# Patient Record
Sex: MALE | Race: OTHER
[De-identification: names, ages, dates, MRNs, and addresses within clinical notes are randomized per-mention and may not be internally consistent; named-entity substitution may affect disease eponyms.]

---

## 2018-08-17 ENCOUNTER — HOSPITAL ENCOUNTER (EMERGENCY)
Dept: HOSPITAL 61 - ER | Age: 58
Discharge: HOME | End: 2018-08-17
Payer: COMMERCIAL

## 2018-08-17 VITALS — HEIGHT: 71 IN | WEIGHT: 195 LBS | BODY MASS INDEX: 27.3 KG/M2

## 2018-08-17 VITALS — DIASTOLIC BLOOD PRESSURE: 80 MMHG | SYSTOLIC BLOOD PRESSURE: 154 MMHG

## 2018-08-17 DIAGNOSIS — R20.0: ICD-10-CM

## 2018-08-17 DIAGNOSIS — I25.2: ICD-10-CM

## 2018-08-17 DIAGNOSIS — E78.00: ICD-10-CM

## 2018-08-17 DIAGNOSIS — M79.604: ICD-10-CM

## 2018-08-17 DIAGNOSIS — I51.9: ICD-10-CM

## 2018-08-17 DIAGNOSIS — E11.40: ICD-10-CM

## 2018-08-17 DIAGNOSIS — M79.605: Primary | ICD-10-CM

## 2018-08-17 DIAGNOSIS — Z95.1: ICD-10-CM

## 2018-08-17 PROCEDURE — 99281 EMR DPT VST MAYX REQ PHY/QHP: CPT

## 2018-08-17 NOTE — PHYS DOC
Past Medical History


Past Medical History:  Diabetes-Type II, High Cholesterol, Heart Disease, MI


Additional Past Medical Histor:  Neuropathy


Past Surgical History:  Coronary Bypass Surgery


Alcohol Use:  None


Drug Use:  None





Adult General


Chief Complaint


Chief Complaint:  LOWER EXT PAIN





HPI


HPI





Patient is a 57  year old M who presents with bilateral leg pain with exertion 

for months.  Patient reports his pain is worsening.  He gets pain after walking 

only about 100 ft.  He rests and the pain resolves completely.  When he walks 

again, the pain returns.  He reports he is seen at the [facility] in [place]. 

They started him on a medication called pentoxifylline several months ago.  He 

reports no improvement with medications.  He states he is having trouble 

working d/t pain.  He denies cp or sob.  Hx of DM and HTN.





Review of Systems


Review of Systems





Constitutional: Denies fever or chills []


Respiratory: Denies cough or shortness of breath []


Cardiovascular: Denies chest pain


Musculoskeletal: Denies back pain. Bilateral leg pain when walking


Integument: Denies rash or skin lesions []


Neurologic: Denies headache, focal weakness. Bilateral numbness to the great 

toe on both feet








All other systems were reviewed and found to be within normal limits, except as 

documented in this note.





Allergies


Allergies





Allergies








Coded Allergies Type Severity Reaction Last Updated Verified


 


  No Known Drug Allergies    10/27/15 No











Physical Exam


Physical Exam





Constitutional: Well developed, well nourished, no acute distress, non-toxic 

appearance. []


HENT: Normocephalic, atraumatic


Neck: Normal range of motion, no tenderness, supple, no stridor. [] 


Cardiovascular:Heart rate regular rhythm, no murmur []


Lungs & Thorax:  Bilateral breath sounds clear to auscultation []


Skin: Warm, dry, no erythema, no rash. [] 


Extremities: No tenderness, ROM intact, no edema. [] 


Neurologic: Alert and oriented X 3, normal motor function, no focal deficits 

noted. []


Psychologic: Affect normal, judgement normal, mood normal. []





Current Patient Data


Vital Signs





 Vital Signs








  Date Time  Temp Pulse Resp B/P (MAP) Pulse Ox O2 Delivery O2 Flow Rate FiO2


 


8/17/18 15:50 98.5 95 16 154/80 (104) 98 Room Air  





 98.5       











EKG


EKG


[]





Radiology/Procedures


Radiology/Procedures


[]





Course & Med Decision Making


Course & Med Decision Making


Pertinent Labs and Imaging studies reviewed. (See chart for details)


No indication for acute evaluation at this time. Patient needs referral to 

cardiology for intermittent claudication.





d/w Dr. Dempsey, happy to see patient in the clinic for evaluation.  





Plan:  appt with Dr. Dempsey on Wed 8/22/18 at 10:15am, return precautions 

reviewed





Dragon Disclaimer


Dragon Disclaimer


This electronic medical record was generated, in whole or in part, using a 

voice recognition dictation system.





Departure


Departure


Impression:  


 Primary Impression:  


 Leg pain, bilateral


Disposition:  01 HOME, SELF-CARE


Condition:  GOOD


Referrals:  


SAMUEL DEMPSEY MD


Patient Instructions:  Intermittent Claudication





Additional Instructions:  


You have an appointment scheduled with Dr. Dempsey at 10:15am on Wednesday August 22.











HORACE PIERRE Aug 17, 2018 16:12

## 2018-08-30 ENCOUNTER — HOSPITAL ENCOUNTER (OUTPATIENT)
Dept: HOSPITAL 61 - CCL | Age: 58
Discharge: HOME | End: 2018-08-30
Attending: INTERNAL MEDICINE
Payer: COMMERCIAL

## 2018-08-30 VITALS — DIASTOLIC BLOOD PRESSURE: 72 MMHG | SYSTOLIC BLOOD PRESSURE: 125 MMHG

## 2018-08-30 VITALS — DIASTOLIC BLOOD PRESSURE: 73 MMHG | SYSTOLIC BLOOD PRESSURE: 116 MMHG

## 2018-08-30 VITALS — SYSTOLIC BLOOD PRESSURE: 121 MMHG | DIASTOLIC BLOOD PRESSURE: 72 MMHG

## 2018-08-30 VITALS — DIASTOLIC BLOOD PRESSURE: 75 MMHG | SYSTOLIC BLOOD PRESSURE: 114 MMHG

## 2018-08-30 VITALS — DIASTOLIC BLOOD PRESSURE: 65 MMHG | SYSTOLIC BLOOD PRESSURE: 111 MMHG

## 2018-08-30 VITALS — SYSTOLIC BLOOD PRESSURE: 115 MMHG | DIASTOLIC BLOOD PRESSURE: 75 MMHG

## 2018-08-30 VITALS — SYSTOLIC BLOOD PRESSURE: 114 MMHG | DIASTOLIC BLOOD PRESSURE: 79 MMHG

## 2018-08-30 VITALS — HEIGHT: 72 IN | BODY MASS INDEX: 27.09 KG/M2 | WEIGHT: 200 LBS

## 2018-08-30 VITALS — DIASTOLIC BLOOD PRESSURE: 71 MMHG | SYSTOLIC BLOOD PRESSURE: 107 MMHG

## 2018-08-30 VITALS — SYSTOLIC BLOOD PRESSURE: 147 MMHG | DIASTOLIC BLOOD PRESSURE: 79 MMHG

## 2018-08-30 VITALS — DIASTOLIC BLOOD PRESSURE: 68 MMHG | SYSTOLIC BLOOD PRESSURE: 116 MMHG

## 2018-08-30 VITALS — SYSTOLIC BLOOD PRESSURE: 119 MMHG | DIASTOLIC BLOOD PRESSURE: 73 MMHG

## 2018-08-30 VITALS — SYSTOLIC BLOOD PRESSURE: 124 MMHG | DIASTOLIC BLOOD PRESSURE: 70 MMHG

## 2018-08-30 VITALS — DIASTOLIC BLOOD PRESSURE: 63 MMHG | SYSTOLIC BLOOD PRESSURE: 113 MMHG

## 2018-08-30 VITALS — DIASTOLIC BLOOD PRESSURE: 77 MMHG | SYSTOLIC BLOOD PRESSURE: 114 MMHG

## 2018-08-30 DIAGNOSIS — Z79.01: ICD-10-CM

## 2018-08-30 DIAGNOSIS — I70.213: Primary | ICD-10-CM

## 2018-08-30 LAB
ANION GAP SERPL CALC-SCNC: 5 MMOL/L (ref 6–14)
APTT BLD: 27 SEC (ref 24–38)
BUN SERPL-MCNC: 20 MG/DL (ref 8–26)
CALCIUM SERPL-MCNC: 8.6 MG/DL (ref 8.5–10.1)
CHLORIDE SERPL-SCNC: 107 MMOL/L (ref 98–107)
CO2 SERPL-SCNC: 25 MMOL/L (ref 21–32)
CREAT SERPL-MCNC: 1.1 MG/DL (ref 0.7–1.3)
ERYTHROCYTE [DISTWIDTH] IN BLOOD BY AUTOMATED COUNT: 14.1 % (ref 11.5–14.5)
GFR SERPLBLD BASED ON 1.73 SQ M-ARVRAT: 68.5 ML/MIN
GLUCOSE SERPL-MCNC: 206 MG/DL (ref 70–99)
HCT VFR BLD CALC: 42.4 % (ref 39–53)
HGB BLD-MCNC: 14.4 G/DL (ref 13–17.5)
MCH RBC QN AUTO: 30 PG (ref 25–35)
MCHC RBC AUTO-ENTMCNC: 34 G/DL (ref 31–37)
MCV RBC AUTO: 88 FL (ref 79–100)
PLATELET # BLD AUTO: 222 X10^3/UL (ref 140–400)
POTASSIUM SERPL-SCNC: 4.8 MMOL/L (ref 3.5–5.1)
PROTHROMBIN TIME: 12.8 SEC (ref 11.7–14)
RBC # BLD AUTO: 4.8 X10^6/UL (ref 4.3–5.7)
SODIUM SERPL-SCNC: 137 MMOL/L (ref 136–145)
WBC # BLD AUTO: 7.2 X10^3/UL (ref 4–11)

## 2018-08-30 PROCEDURE — 85610 PROTHROMBIN TIME: CPT

## 2018-08-30 PROCEDURE — 85730 THROMBOPLASTIN TIME PARTIAL: CPT

## 2018-08-30 PROCEDURE — C1769 GUIDE WIRE: HCPCS

## 2018-08-30 PROCEDURE — 75630 X-RAY AORTA LEG ARTERIES: CPT

## 2018-08-30 PROCEDURE — 99152 MOD SED SAME PHYS/QHP 5/>YRS: CPT

## 2018-08-30 PROCEDURE — 99153 MOD SED SAME PHYS/QHP EA: CPT

## 2018-08-30 PROCEDURE — 85027 COMPLETE CBC AUTOMATED: CPT

## 2018-08-30 PROCEDURE — 80048 BASIC METABOLIC PNL TOTAL CA: CPT

## 2018-08-30 PROCEDURE — 36415 COLL VENOUS BLD VENIPUNCTURE: CPT

## 2018-08-30 PROCEDURE — 36246 INS CATH ABD/L-EXT ART 2ND: CPT

## 2018-08-30 NOTE — CARD
MR#: [id number]

Account#: [id number]

Accession#: [id number]

Date of Study: 08/30/2018

Ordering Physician: SAMUEL DEMPSEY, 

Referring Physician: SAMUEL DEMPSEY, 

Tech: RT Devika (R)





--------------- APPROVED REPORT --------------





Technologist: RT Devika (R)

Nurse: Nancy Tsai RN



Procedure(s) performed: 60 MINS SEDATION

Aortogram with bilateral femoral run-off



HISTORY

: The patient is a 57 year-old femalemale with a history of .



INDICATION

The indication(s) include : significant bilateral claudication (left greater than right) that is life
style limting. .



PROCEDURE NARRATIVE

After appropriate informed consent, the patient was brought to the cath lab and placed in the supine 
position. Preprocedural timeout was completed and confirmed the right patient and procedure. The bila
teral groins were prepped and draped in usual sterile fashion. Moderate sedation acheived with Fentan
yl and Versed. The patient received 2000 units of Heparin for anticoagulation. 



Access: Under lidocaine local anesthesia, a 5Fr introducer sheath was placed in the RCFA via the janee
fied seldinger technique with a J-tipped guidewire and an 18g needle. Diagnostic angiography was then
 performed using a 5Fr Omniflush catheter with digital subtraction angiography. Next, the contralater
al (LCFA) was accessed with the aid of the Omniflush catheter and a J-tipped guidewire. The omniflush
 was then exchanged for a long angled glide catheter which was then placed in the RCFA. Repeat left l
ower extremity with DSA was performed. Subsequently, the glidecatheter was used to do a pullback heather
uver on the BECKY stenosis. No significant stenosis was identified at the levels of the common iliac a
rteries and the left internal iliac artery. 



FINDINGS: 

AO: 140/80



AORTA: No significant disease. 

RCIA: No significant disease. 

REIA: No significant disease. 

RIIA: has a proximal 70% stenosis. This is the dominant supply of the right lower extremity as it exi
ts the pelvis and supplies the territory of the SFA and connects to the popliteal artery. 

RCFA: No significant disease. 

RSFA: No significant disease. The vessel is hypoplastic and terminates at the level of the adductor c
anal. 

RPOP: No significant disease. 

RAT: Occluded. 

RTP trunk: Mild irregularities of up to 30%. 

RPT: No significant disease. 

RPER: No significant disease. 



LCIA: No significant disease. 

LIIA: Has a proximal 50% eccentric stenosis. This is the main arterial supply to the lower extremity 
and exits the pelvis and connects to the popliteal artery. There is a 100%  involving the vessel p
rior to the popliteal connection. 

BECKY: No significant disease. 

LCFA: No significant disease. 

LSFA: No significant disease. This vessel is hypoplastic and gives geniculate level collaterals to th
e popliteal artery. 

LPOP: No significant disease. 

LTP: Moderate 50% stenosis. 

LPER: No significant disease. 

LPT: No significant disease. 

LAT: Occluded proximally. 



At case completion, the right sided sheath was removed via manual compression. The patient tolerated 
the procedure well and there were no immediate complications. 



Conclusion

1. 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

2. Anatomical variant with the internal iliac artery providing the major arterial supply to the legs 
bilaterally. The SFA is severely hypoplastic. 

3. 100%  of the L internal iliac artery (SFA) in the mid thigh with collaterals from the hypoplast
ic LSFA with reconstitution at the level of P2. 



Recommendations

1. Vascular surgery evaluation for bypass of the left "SFA"

2. Plan for staged PVI of the right internal iliac artery via endovascular techniques. 

3. Discussed with Dr. Espino. 



Signed by : Samuel Dempsey, 

Electronically Approved : 08/30/2018 10:30:37

## 2018-10-20 ENCOUNTER — HOSPITAL ENCOUNTER (EMERGENCY)
Dept: HOSPITAL 61 - ER | Age: 58
Discharge: HOME | End: 2018-10-20
Payer: SELF-PAY

## 2018-10-20 VITALS — SYSTOLIC BLOOD PRESSURE: 158 MMHG | DIASTOLIC BLOOD PRESSURE: 80 MMHG

## 2018-10-20 VITALS — WEIGHT: 199 LBS | BODY MASS INDEX: 29.47 KG/M2 | HEIGHT: 69 IN

## 2018-10-20 DIAGNOSIS — T81.41XA: Primary | ICD-10-CM

## 2018-10-20 DIAGNOSIS — E78.00: ICD-10-CM

## 2018-10-20 DIAGNOSIS — Z95.1: ICD-10-CM

## 2018-10-20 DIAGNOSIS — E11.40: ICD-10-CM

## 2018-10-20 DIAGNOSIS — I25.2: ICD-10-CM

## 2018-10-20 DIAGNOSIS — L53.8: ICD-10-CM

## 2018-10-20 PROCEDURE — 87075 CULTR BACTERIA EXCEPT BLOOD: CPT

## 2018-10-20 PROCEDURE — 87071 CULTURE AEROBIC QUANT OTHER: CPT

## 2018-10-20 PROCEDURE — 99284 EMERGENCY DEPT VISIT MOD MDM: CPT

## 2018-10-20 NOTE — PHYS DOC
Past Medical History


Past Medical History:  Diabetes-Type II, High Cholesterol, Heart Disease, MI


Additional Past Medical Histor:  Neuropathy


Past Surgical History:  Coronary Bypass Surgery


Additional Past Surgical Histo:  FEMORAL POPITEAL BYPASS


Alcohol Use:  None


Drug Use:  None





Adult General


Chief Complaint


Chief Complaint:  POST-OP PROBLEM





HPI


HPI





Patient is a 58  year old male who presents with post-op complication.  The 

patient underwent a femoral popliteal bypass on Sept 28. He has been recovering 

well. He presents to the ER today however with some drainage and redness at the 

surgical site in the left groin. Patient denies numbness, tingling, or cold 

sensation in that extremity. He hasn't had a fever or chills. He does not 

complain of pain. The wound on the leg and the left extremity is well-healing 

according to the patient.  Surgery completed by Dr. Fagan.





Review of Systems


Review of Systems





Constitutional: Denies fever or chills 


Eyes: Denies 


Respiratory: Denies cough 


Cardiovascular: No additional information not addressed in HPI


GI: Denies abdominal pain


Musculoskeletal: Denies back pain 


Integument: Denies rash or skin lesions


Neurologic: Denies headache





All other systems were reviewed and found to be within normal limits, except as 

documented in this note.





Current Medications


Current Medications





Current Medications








 Medications


  (Trade)  Dose


 Ordered  Sig/Makenzie  Start Time


 Stop Time Status Last Admin


Dose Admin


 


 Clindamycin HCl


  (Cleocin)  600 mg  1X  ONCE  10/20/18 11:30


 10/20/18 11:31 DC 10/20/18 11:29


600 MG


 


 Neomycin/


 Polymyxin/


 Bacitracin


  (Triple


 Antibiotic


 Ointment)  2 pkt  1X  ONCE  10/20/18 11:15


 10/20/18 11:16 DC 10/20/18 11:15


2 PKT











Allergies


Allergies





Allergies








Coded Allergies Type Severity Reaction Last Updated Verified


 


  No Known Drug Allergies    9/24/18 No











Physical Exam


Physical Exam





Constitutional: Well developed, well nourished, no acute distress, non-toxic 

appearance


HENT: Normocephalic 


Neck: Normal range of motion 


Cardiovascular:Heart rate regular rhythm, no murmur 


Lungs & Thorax:  Bilateral breath sounds clear to auscultation 


Abdomen: Bowel sounds normal, soft, no tenderness 


Skin: Warm, dry, no erythema, no rash  


Extremities:  surgical incision on medial left leg is well-appearing.  wound 

edges are well-approximated.  no local swelling, erythema, drainage.  Incision 

in left groin is mildly erythematous.  there is about 1 cm area of dehiscence 

at the proximal end of the incision with some purulent drainage overlying 

granulation tissue.  the rest of the incision is erythematous and surrounding 

skin, but no palpable fluctuance.  2+ pulses in femoral, dorsalis pedis.  Skin 

with brisk capillary refill and sensation to light touch intact.  Compartments 

of the leg are soft. 


Neurologic: Alert and oriented X 3


Psychologic: Affect normal





Current Patient Data


Vital Signs





 Vital Signs








  Date Time  Temp Pulse Resp B/P (MAP) Pulse Ox O2 Delivery O2 Flow Rate FiO2


 


10/20/18 11:40  80 16 158/80 (106) 100 Room Air  


 


10/20/18 10:42 97.7       





 97.7       











EKG


EKG


[]





Radiology/Procedures


Radiology/Procedures


[]





Course & Med Decision Making


Course & Med Decision Making


Pertinent Labs and Imaging studies reviewed. (See chart for details)





Patient is evaluated for postoperative complication of an incision in the left 

groin. Examination of the incision as documented above is suspicious for local 

incisional infection. Patient does not have a fever. His vital signs are 

normal. He is nontoxic. He denies any other complaints. There is no drainable 

fluid collection on examination today. In the ER, a swab is taken for culture. 

The patient is given clindamycin. Wound is dressed with Neosporin and gauze. I 

did discuss this patient with the vascular surgeon on-call and these were the 

recommendations. The patient will contact their office on Monday morning and 

will have an expedited appointment. I advised patient to come back to the ER 

over the weekend if his symptoms severely worsen.





Dragon Disclaimer


Dragon Disclaimer


This electronic medical record was generated, in whole or in part, using a 

voice recognition dictation system.





Departure


Departure


Impression:  


 Primary Impression:  


 Superficial incisional infection of surgical site


Disposition:  01 HOME, SELF-CARE


Condition:  GOOD


Referrals:  


NO PCP (PCP)








SAMMY FAGAN DO


Patient Instructions:  Wound Infection, Easy-to-Read, Wound Dehiscence, Easy-to-

Read





Additional Instructions:  


You have a minor infection of your surgical incision.  Take the antibiotics as 

directed.  Today, we made contact with Dr. Javier's partner, Dr. Barajas, who 

recommended you call the office Monday morning and they will expedite your 

appointment.  If your wound becomes severely worse prior to Monday, return to 

the ER.  Keep the wound covered and apply antibacterial ointment twice daily.


Scripts


Clindamycin Hcl (CLINDAMYCIN HCL) 150 Mg Capsule


1 CAP PO QID, #40 CAP


   Prov: MARGOT FERRO DO         10/20/18











MARGOT FERRO DO Oct 20, 2018 11:30

## 2019-06-24 ENCOUNTER — HOSPITAL ENCOUNTER (OUTPATIENT)
Dept: HOSPITAL 61 - CCL | Age: 59
Setting detail: OBSERVATION
Discharge: HOME | End: 2019-06-24
Attending: SURGERY | Admitting: INTERNAL MEDICINE
Payer: COMMERCIAL

## 2019-06-24 VITALS — DIASTOLIC BLOOD PRESSURE: 73 MMHG | SYSTOLIC BLOOD PRESSURE: 141 MMHG

## 2019-06-24 VITALS — BODY MASS INDEX: 27.92 KG/M2 | HEIGHT: 70 IN | WEIGHT: 195 LBS

## 2019-06-24 VITALS — SYSTOLIC BLOOD PRESSURE: 142 MMHG | DIASTOLIC BLOOD PRESSURE: 71 MMHG

## 2019-06-24 VITALS — SYSTOLIC BLOOD PRESSURE: 141 MMHG | DIASTOLIC BLOOD PRESSURE: 77 MMHG

## 2019-06-24 VITALS — DIASTOLIC BLOOD PRESSURE: 69 MMHG | SYSTOLIC BLOOD PRESSURE: 132 MMHG

## 2019-06-24 VITALS — DIASTOLIC BLOOD PRESSURE: 68 MMHG | SYSTOLIC BLOOD PRESSURE: 119 MMHG

## 2019-06-24 VITALS — SYSTOLIC BLOOD PRESSURE: 125 MMHG | DIASTOLIC BLOOD PRESSURE: 74 MMHG

## 2019-06-24 VITALS — SYSTOLIC BLOOD PRESSURE: 129 MMHG | DIASTOLIC BLOOD PRESSURE: 60 MMHG

## 2019-06-24 VITALS — DIASTOLIC BLOOD PRESSURE: 72 MMHG | SYSTOLIC BLOOD PRESSURE: 151 MMHG

## 2019-06-24 VITALS — DIASTOLIC BLOOD PRESSURE: 70 MMHG | SYSTOLIC BLOOD PRESSURE: 128 MMHG

## 2019-06-24 VITALS — DIASTOLIC BLOOD PRESSURE: 73 MMHG | SYSTOLIC BLOOD PRESSURE: 145 MMHG

## 2019-06-24 VITALS — DIASTOLIC BLOOD PRESSURE: 73 MMHG | SYSTOLIC BLOOD PRESSURE: 144 MMHG

## 2019-06-24 VITALS — DIASTOLIC BLOOD PRESSURE: 76 MMHG | SYSTOLIC BLOOD PRESSURE: 139 MMHG

## 2019-06-24 DIAGNOSIS — Z79.01: ICD-10-CM

## 2019-06-24 DIAGNOSIS — Z79.899: ICD-10-CM

## 2019-06-24 DIAGNOSIS — Z95.1: ICD-10-CM

## 2019-06-24 DIAGNOSIS — Z79.82: ICD-10-CM

## 2019-06-24 DIAGNOSIS — K21.9: ICD-10-CM

## 2019-06-24 DIAGNOSIS — I10: ICD-10-CM

## 2019-06-24 DIAGNOSIS — E11.51: ICD-10-CM

## 2019-06-24 DIAGNOSIS — I70.212: Primary | ICD-10-CM

## 2019-06-24 DIAGNOSIS — I25.10: ICD-10-CM

## 2019-06-24 DIAGNOSIS — Z82.49: ICD-10-CM

## 2019-06-24 LAB
ALBUMIN SERPL-MCNC: 3.4 G/DL (ref 3.4–5)
ALBUMIN/GLOB SERPL: 0.9 {RATIO} (ref 1–1.7)
ALP SERPL-CCNC: 128 U/L (ref 46–116)
ALT SERPL-CCNC: 14 U/L (ref 16–63)
ANION GAP SERPL CALC-SCNC: 10 MMOL/L (ref 6–14)
AST SERPL-CCNC: 12 U/L (ref 15–37)
BASOPHILS # BLD AUTO: 0.1 X10^3/UL (ref 0–0.2)
BASOPHILS NFR BLD: 1 % (ref 0–3)
BILIRUB SERPL-MCNC: 0.5 MG/DL (ref 0.2–1)
BUN SERPL-MCNC: 34 MG/DL (ref 8–26)
BUN/CREAT SERPL: 19 (ref 6–20)
CALCIUM SERPL-MCNC: 8.6 MG/DL (ref 8.5–10.1)
CHLORIDE SERPL-SCNC: 101 MMOL/L (ref 98–107)
CO2 SERPL-SCNC: 25 MMOL/L (ref 21–32)
CREAT SERPL-MCNC: 1.8 MG/DL (ref 0.7–1.3)
EOSINOPHIL NFR BLD: 0.2 X10^3/UL (ref 0–0.7)
EOSINOPHIL NFR BLD: 3 % (ref 0–3)
ERYTHROCYTE [DISTWIDTH] IN BLOOD BY AUTOMATED COUNT: 15 % (ref 11.5–14.5)
GFR SERPLBLD BASED ON 1.73 SQ M-ARVRAT: 38.7 ML/MIN
GLOBULIN SER-MCNC: 3.8 G/DL (ref 2.2–3.8)
GLUCOSE SERPL-MCNC: 221 MG/DL (ref 70–99)
HCT VFR BLD CALC: 41.6 % (ref 39–53)
HGB BLD-MCNC: 13.8 G/DL (ref 13–17.5)
LYMPHOCYTES # BLD: 2.3 X10^3/UL (ref 1–4.8)
LYMPHOCYTES NFR BLD AUTO: 30 % (ref 24–48)
MCH RBC QN AUTO: 29 PG (ref 25–35)
MCHC RBC AUTO-ENTMCNC: 33 G/DL (ref 31–37)
MCV RBC AUTO: 87 FL (ref 79–100)
MONO #: 0.7 X10^3/UL (ref 0–1.1)
MONOCYTES NFR BLD: 9 % (ref 0–9)
NEUT #: 4.6 X10^3UL (ref 1.8–7.7)
NEUTROPHILS NFR BLD AUTO: 58 % (ref 31–73)
PLATELET # BLD AUTO: 255 X10^3/UL (ref 140–400)
POTASSIUM SERPL-SCNC: 4.5 MMOL/L (ref 3.5–5.1)
PROT SERPL-MCNC: 7.2 G/DL (ref 6.4–8.2)
PROTHROMBIN TIME: 12.3 SEC (ref 11.7–14)
RBC # BLD AUTO: 4.76 X10^6/UL (ref 4.3–5.7)
SODIUM SERPL-SCNC: 136 MMOL/L (ref 136–145)
WBC # BLD AUTO: 7.9 X10^3/UL (ref 4–11)

## 2019-06-24 PROCEDURE — 96375 TX/PRO/DX INJ NEW DRUG ADDON: CPT

## 2019-06-24 PROCEDURE — 37224: CPT

## 2019-06-24 PROCEDURE — 85025 COMPLETE CBC W/AUTO DIFF WBC: CPT

## 2019-06-24 PROCEDURE — 99153 MOD SED SAME PHYS/QHP EA: CPT

## 2019-06-24 PROCEDURE — 36415 COLL VENOUS BLD VENIPUNCTURE: CPT

## 2019-06-24 PROCEDURE — 99152 MOD SED SAME PHYS/QHP 5/>YRS: CPT

## 2019-06-24 PROCEDURE — G0379 DIRECT REFER HOSPITAL OBSERV: HCPCS

## 2019-06-24 PROCEDURE — C1771 REP DEV, URINARY, W/SLING: HCPCS

## 2019-06-24 PROCEDURE — G0269 OCCLUSIVE DEVICE IN VEIN ART: HCPCS

## 2019-06-24 PROCEDURE — C1892 INTRO/SHEATH,FIXED,PEEL-AWAY: HCPCS

## 2019-06-24 PROCEDURE — C1885 CATH, TRANSLUMIN ANGIO LASER: HCPCS

## 2019-06-24 PROCEDURE — 75625 CONTRAST EXAM ABDOMINL AORTA: CPT

## 2019-06-24 PROCEDURE — 96374 THER/PROPH/DIAG INJ IV PUSH: CPT

## 2019-06-24 PROCEDURE — 75710 ARTERY X-RAYS ARM/LEG: CPT

## 2019-06-24 PROCEDURE — 76937 US GUIDE VASCULAR ACCESS: CPT

## 2019-06-24 PROCEDURE — C1894 INTRO/SHEATH, NON-LASER: HCPCS

## 2019-06-24 PROCEDURE — 82962 GLUCOSE BLOOD TEST: CPT

## 2019-06-24 PROCEDURE — G0378 HOSPITAL OBSERVATION PER HR: HCPCS

## 2019-06-24 PROCEDURE — 80053 COMPREHEN METABOLIC PANEL: CPT

## 2019-06-24 PROCEDURE — C2623 CATH, TRANSLUMIN, DRUG-COAT: HCPCS

## 2019-06-24 PROCEDURE — 85610 PROTHROMBIN TIME: CPT

## 2019-06-24 PROCEDURE — C1769 GUIDE WIRE: HCPCS

## 2019-06-24 NOTE — NUR
Discharge Note:



EMILE SAMS [place]



Discharge instructions and discharge home medications reviewed with Patient and a copy 
given. All questions have been answered and understanding verbalized. 



The following instructions and handouts were given: patient visit, follow up information. 



Discontinued lines and drains: peripheral IV, tip intact.



Patient discharged to home with self care via private vehicle. 



Patient left unit in stable condition with all personal belongings.

## 2019-06-24 NOTE — PDOC1
History and Physical


Date of Admission


Date of Admission


DATE: 6/24/19 


TIME: 18:36





Identification/Chief Complaint


Chief Complaint


CTA runoff observation





Source


Source:  Chart review, Patient





History of Present Illness


History of Present Illness


58 year old with PVD admitted for observation following CTA runoff.Has hx 

Atherosclerosis with left lower extremity claudication and underwent 

Percutaneous endovascular balloon angioplasty of the left mid fem-pop bypass


vein graft. patient seen on floor with no complaints. no bleeding at catheter 

site. started on plavix and all home meds resumed.





Past Medical History


Cardiovascular:  CAD, HTN, Other


Pulmonary:  No pertinent hx


CENTRAL NERVOUS SYSTEM:  Periperal neuropathy


GI:  GERD


Heme/Onc:  No pertinent hx


Hepatobiliary:  No pertinent hx


Psych:  No pertinent hx


Musculoskeletal:  Other


Rheumatologic:  No pertinent hx


Infectious disease:  No pertinent hx


Renal/:  No pertinent hx


Endocrine:  Diabetes





Past Surgical History


Past Surgical History:  CABG





Family History


Family History:  Coronary Artery Disease





Social History


ALCOHOL:  none


Drugs:  None





Current Medications


Current Medications





Current Medications


Sodium Chloride 1,000 ml @  100 mls/hr 1X  ONCE IV  Last administered on 

6/24/19at 11:00;  Start 6/24/19 at 11:15;  Stop 6/24/19 at 21:14


Iodixanol (Visipaque 320) 100 ml STK-MED ONCE .ROUTE ;  Start 6/24/19 at 12:20; 

Stop 6/24/19 at 12:21;  Status DC


Lidocaine HCl (Lidocaine 1% 20ml Vial) 20 ml STK-MED ONCE .ROUTE ;  Start 

6/24/19 at 12:20;  Stop 6/24/19 at 12:21;  Status DC


Heparin Sodium/ Sodium Chloride 500 ml @  As Directed STK-MED ONCE .ROUTE ;  

Start 6/24/19 at 12:20;  Stop 6/24/19 at 12:21;  Status DC


Midazolam HCl (Versed) 5 mg STK-MED ONCE .ROUTE ;  Start 6/24/19 at 13:42;  Stop

6/24/19 at 13:43;  Status DC


Fentanyl Citrate (Fentanyl 2ml Vial) 100 mcg STK-MED ONCE .ROUTE ;  Start 

6/24/19 at 13:42;  Stop 6/24/19 at 13:43;  Status DC


Heparin Sodium (Porcine) (Heparin Sodium) 10,000 unit STK-MED ONCE .ROUTE ;  

Start 6/24/19 at 13:43;  Stop 6/24/19 at 13:44;  Status DC


Heparin Sodium/ Sodium Chloride (HEPARIN for ARTERIAL LINE FLUSH) 1,000 unit 1X 

ONCE IART  Last administered on 6/24/19at 15:31;  Start 6/24/19 at 14:00;  Stop 

6/24/19 at 14:01;  Status DC


Midazolam HCl (Versed) 5 mg 1X  ONCE IV  Last administered on 6/24/19at 15:32;  

Start 6/24/19 at 14:00;  Stop 6/24/19 at 14:01;  Status DC


Fentanyl Citrate (Fentanyl 2ml Vial) 100 mcg 1X  ONCE IV  Last administered on 

6/24/19at 15:32;  Start 6/24/19 at 14:00;  Stop 6/24/19 at 14:01;  Status DC


Iodixanol (Visipaque 320) 100 ml 1X  ONCE IART  Last administered on 6/24/19at 

15:31;  Start 6/24/19 at 14:00;  Stop 6/24/19 at 14:01;  Status DC


Lidocaine HCl (Lidocaine 1% 20ml Vial) 20 ml 1X  ONCE INJ  Last administered on 

6/24/19at 15:31;  Start 6/24/19 at 14:00;  Stop 6/24/19 at 14:01;  Status DC


Heparin Sodium/ Sodium Chloride 500 ml @  As Directed STK-MED ONCE .ROUTE ;  

Start 6/24/19 at 14:21;  Stop 6/24/19 at 14:22;  Status DC


Heparin Sodium (Porcine) (Heparin Sodium) 8,000 unit 1X  ONCE IV  Last 

administered on 6/24/19at 15:35;  Start 6/24/19 at 14:45;  Stop 6/24/19 at 14

:46;  Status DC


Clopidogrel Bisulfate (Plavix) 300 mg 1X  ONCE PO  Last administered on 

6/24/19at 15:32;  Start 6/24/19 at 15:30;  Stop 6/24/19 at 15:31;  Status DC


Clopidogrel Bisulfate (Plavix) 75 mg STK-MED ONCE .ROUTE ;  Start 6/24/19 at 

15:19;  Stop 6/24/19 at 15:20;  Status DC


Clopidogrel Bisulfate (Plavix) 75 mg DAILYWBKFT PO ;  Start 6/25/19 at 08:00





Active Scripts


Active


Clopidogrel (Clopidogrel Bisulfate) 75 Mg Tablet 75 Mg PO DAILYWBKFT 30 Days


Reported


Viagra (Sildenafil Citrate) 100 Mg Tablet 1 Tab PO PRN DAILY


Nystatin 15 Gm Powder 1 Qasim TP BID


Mupirocin Ointment (Mupirocin) 22 Gm Oint...g. 1 Qasim TP DAILY


Lisinopril 20 Mg Tablet 1 Tab PO DAILY


Gabapentin 800 Mg Tablet 800 Mg PO Q8HRS


Atorvastatin Calcium 80 Mg Tablet 1 Tab PO DAILY


Janumet 50-1,000 Mg Tablet (Sitagliptin Phos/Metformin Hcl) 1 Each Tablet 1 Tab 

PO BID


Pentoxifylline 400 Mg Tablet.er 400 Mg PO TID


Cymbalta (Duloxetine Hcl) 30 Mg Capsule.dr 30 Mg PO QHS


Glipizide 10 Mg Tablet 1 Tab PO BID


Aspirin 81 Mg Tab.chew 1 Tab PO DAILY





Allergies


Allergies:  


Coded Allergies:  


     No Known Drug Allergies (Unverified , 9/24/18)





ROS


Review of System


CONSTITUTIONAL:        No fever or chills


EYES:                          No recent changes


SKIN:               No rash or itching


CARDIOVASCULAR:     No chest pain, syncope, palpitations, or edema


RESPIRATORY:            No SOB or cough


GASTROINTESTINAL:    No nausea, vomiting or abdominal pain


NEUROLOGICAL:          No headaches or weakness


ENDOCRINE:               No cold or heat intolerance


GENITOURINARY:         No urgency or frequency of urination


MUSCULOSKELETAL:   No back pain or joint pain


LYMPHATICS:               No enlarged lymph nodes


PSYCHIATRIC:              No anxiety or depression





Physical Exam


Physical Exam


GENERAL:       No apparent distress. Alert and oriented.


HEENT:            Head normocephalic, atraumatic.


NECK:              Supple


LUNGS:            Clear to auscultation.


HEART:            RRR, S1, S2 present, pulses intact


ABDOMEN:       Soft, positive bowel sounds.


EXTREMITIES:  No cyanosis or edema.


NEUROLOGIC:  Normal speech, normal tone


PSYCHIATRIC:  Normal affect, normal mood.


SKIN:                No ulceration.





Vitals


Vitals





Vital Signs








  Date Time  Temp Pulse Resp B/P (MAP) Pulse Ox O2 Delivery O2 Flow Rate FiO2


 


6/24/19 18:15  77  119/68 (85) 96   


 


6/24/19 16:02      Room Air  


 


6/24/19 15:38   18     


 


6/24/19 11:01 97.8       





 97.8       











Labs


Labs





Laboratory Tests








Test


 6/24/19


10:15 6/24/19


17:31


 


White Blood Count


 7.9 x10^3/uL


(4.0-11.0) 





 


Red Blood Count


 4.76 x10^6/uL


(4.30-5.70) 





 


Hemoglobin


 13.8 g/dL


(13.0-17.5) 





 


Hematocrit


 41.6 %


(39.0-53.0) 





 


Mean Corpuscular Volume 87 fL ()  


 


Mean Corpuscular Hemoglobin 29 pg (25-35)  


 


Mean Corpuscular Hemoglobin


Concent 33 g/dL


(31-37) 





 


Red Cell Distribution Width


 15.0 %


(11.5-14.5) 





 


Platelet Count


 255 x10^3/uL


(140-400) 





 


Neutrophils (%) (Auto) 58 % (31-73)  


 


Lymphocytes (%) (Auto) 30 % (24-48)  


 


Monocytes (%) (Auto) 9 % (0-9)  


 


Eosinophils (%) (Auto) 3 % (0-3)  


 


Basophils (%) (Auto) 1 % (0-3)  


 


Neutrophils # (Auto)


 4.6 x10^3uL


(1.8-7.7) 





 


Lymphocytes # (Auto)


 2.3 x10^3/uL


(1.0-4.8) 





 


Monocytes # (Auto)


 0.7 x10^3/uL


(0.0-1.1) 





 


Eosinophils # (Auto)


 0.2 x10^3/uL


(0.0-0.7) 





 


Basophils # (Auto)


 0.1 x10^3/uL


(0.0-0.2) 





 


Prothrombin Time


 12.3 SEC


(11.7-14.0) 





 


Prothromb Time International


Ratio 0.9 (0.8-1.1) 


 





 


Sodium Level


 136 mmol/L


(136-145) 





 


Potassium Level


 4.5 mmol/L


(3.5-5.1) 





 


Chloride Level


 101 mmol/L


() 





 


Carbon Dioxide Level


 25 mmol/L


(21-32) 





 


Anion Gap 10 (6-14)  


 


Blood Urea Nitrogen


 34 mg/dL


(8-26) 





 


Creatinine


 1.8 mg/dL


(0.7-1.3) 





 


Estimated GFR


(Cockcroft-Gault) 38.7 


 





 


BUN/Creatinine Ratio 19 (6-20)  


 


Glucose Level


 221 mg/dL


(70-99) 





 


Calcium Level


 8.6 mg/dL


(8.5-10.1) 





 


Total Bilirubin


 0.5 mg/dL


(0.2-1.0) 





 


Aspartate Amino Transf


(AST/SGOT) 12 U/L (15-37) 


 





 


Alanine Aminotransferase


(ALT/SGPT) 14 U/L (16-63) 


 





 


Alkaline Phosphatase


 128 U/L


() 





 


Total Protein


 7.2 g/dL


(6.4-8.2) 





 


Albumin


 3.4 g/dL


(3.4-5.0) 





 


Albumin/Globulin Ratio 0.9 (1.0-1.7)  


 


Glucose (Fingerstick)


 


 193 mg/dL


(70-99)








Laboratory Tests








Test


 6/24/19


10:15 6/24/19


17:31


 


White Blood Count


 7.9 x10^3/uL


(4.0-11.0) 





 


Red Blood Count


 4.76 x10^6/uL


(4.30-5.70) 





 


Hemoglobin


 13.8 g/dL


(13.0-17.5) 





 


Hematocrit


 41.6 %


(39.0-53.0) 





 


Mean Corpuscular Volume 87 fL ()  


 


Mean Corpuscular Hemoglobin 29 pg (25-35)  


 


Mean Corpuscular Hemoglobin


Concent 33 g/dL


(31-37) 





 


Red Cell Distribution Width


 15.0 %


(11.5-14.5) 





 


Platelet Count


 255 x10^3/uL


(140-400) 





 


Neutrophils (%) (Auto) 58 % (31-73)  


 


Lymphocytes (%) (Auto) 30 % (24-48)  


 


Monocytes (%) (Auto) 9 % (0-9)  


 


Eosinophils (%) (Auto) 3 % (0-3)  


 


Basophils (%) (Auto) 1 % (0-3)  


 


Neutrophils # (Auto)


 4.6 x10^3uL


(1.8-7.7) 





 


Lymphocytes # (Auto)


 2.3 x10^3/uL


(1.0-4.8) 





 


Monocytes # (Auto)


 0.7 x10^3/uL


(0.0-1.1) 





 


Eosinophils # (Auto)


 0.2 x10^3/uL


(0.0-0.7) 





 


Basophils # (Auto)


 0.1 x10^3/uL


(0.0-0.2) 





 


Prothrombin Time


 12.3 SEC


(11.7-14.0) 





 


Prothromb Time International


Ratio 0.9 (0.8-1.1) 


 





 


Sodium Level


 136 mmol/L


(136-145) 





 


Potassium Level


 4.5 mmol/L


(3.5-5.1) 





 


Chloride Level


 101 mmol/L


() 





 


Carbon Dioxide Level


 25 mmol/L


(21-32) 





 


Anion Gap 10 (6-14)  


 


Blood Urea Nitrogen


 34 mg/dL


(8-26) 





 


Creatinine


 1.8 mg/dL


(0.7-1.3) 





 


Estimated GFR


(Cockcroft-Gault) 38.7 


 





 


BUN/Creatinine Ratio 19 (6-20)  


 


Glucose Level


 221 mg/dL


(70-99) 





 


Calcium Level


 8.6 mg/dL


(8.5-10.1) 





 


Total Bilirubin


 0.5 mg/dL


(0.2-1.0) 





 


Aspartate Amino Transf


(AST/SGOT) 12 U/L (15-37) 


 





 


Alanine Aminotransferase


(ALT/SGPT) 14 U/L (16-63) 


 





 


Alkaline Phosphatase


 128 U/L


() 





 


Total Protein


 7.2 g/dL


(6.4-8.2) 





 


Albumin


 3.4 g/dL


(3.4-5.0) 





 


Albumin/Globulin Ratio 0.9 (1.0-1.7)  


 


Glucose (Fingerstick)


 


 193 mg/dL


(70-99)











VTE Prophylaxis Ordered


VTE Prophylaxis Devices:  No (observation)


VTE Pharmacological Prophylaxi:  No





Assessment/Plan


Assessment/Plan


A/P:





Atherosclerosis with left lower extremity claudication s/p  Percutaneous 

endovascular balloon angioplasty of the left mid fem-pop bypass vein graft using

a 6 x 40 [employer]onic IN.PACT Admiral drug-eluting balloon, CO2 abdominal 

aortogram, Selective CO2 angiogram of the left lower extremity, Ultrasound-

guided access of the right common femoral artery. Right common femoral artery 

[employer]. tolerated procedure well with no periprocedural bleeding. will be 

discharged on plavix for 30 days with follow scheduled in vasc clinic. all home 

meds resumed.











LANDEN DAVALOS MD              Jun 24, 2019 18:41

## 2019-06-24 NOTE — PDOC
BRIEF OPERATIVE NOTE


Date:  Jun 24, 2019


Pre-Op Diagnosis


Atherosclerosis with left lower extremity claudication


Post-Op Diagnosis


Same


Procedure Performed


CO2 angiogram with selective left lower extremity CO2 angiogram next


Percutaneous balloon angioplasty of the left mid bypass graft using a drug-

eluting balloon


Ultrasound-guided access of the right common femoral artery


Right common femoral artery [place]


Surgeon


Sammy Miles DO, FACS, RPVI


Anesthesia Type:  Local, Conscious Sedation


Blood Loss


2mL


Findings


See dictation


Complications


None











SAMMY MILES DO                  Jun 24, 2019 16:59

## 2019-06-24 NOTE — OP
DATE OF SURGERY:  06/24/2019



VASCULAR SURGERY OPERATIVE REPORT



ATTENDING SURGEON:  Bib Fagan DO



PREOPERATIVE DIAGNOSIS:  Atherosclerosis with left lower extremity claudication.



POSTOPERATIVE DIAGNOSIS:  Atherosclerosis with left lower extremity

claudication.



PROCEDURES:

1.  Percutaneous endovascular balloon angioplasty of the left mid fem-pop bypass

vein graft using a 6 x 40 Medtronic IN.PACT Admiral drug-eluting balloon (CPT

code 42689).

2.  CO2 abdominal aortogram (CPT code 26268-78).

3.  Selective CO2 angiogram of the left lower extremity (CPT code 91259-15).

4.  Ultrasound-guided access of the right common femoral artery (CPT code

98589-54).

5.  Right common femoral artery StarClose.



ANESTHESIA:  Local with moderate sedation.



SPECIMENS:  None.



COMPLICATIONS:  None.



TOTAL CONTRAST ADMINISTERED:  10 mL, the remainder was CO2.



PREOPERATIVE INDICATIONS:  The patient is a very pleasant 58-year-old male, who

I had performed a left femoral to below knee popliteal artery bypass using in

situ saphenous vein graft.  The patient presented for routine followup and was

found to have a high-grade stenosis of the mid bypass graft.  The patient had

preserved TONYA despite the stenotic area, but I felt that for graft preservation,

it would be warranted to balloon this area.  The patient was consented for

angiography and he understood all risks, benefits, and alternatives prior to

proceeding.



OPERATIVE PROCEDURE:  The patient was brought to the catheterization suite and

placed in the supine position.  After establishing adequate sedation, the right

groin was prepped and draped in sterile fashion.  Next, a timeout procedure was

performed.  It was confirmed that the patient's creatinine level was elevated at

1.8 and we had planned to use carbon dioxide for the majority of the procedure

and images.



At this point in time, using a sterile ultrasound probe, the right common

femoral artery was directly visualized and then 1% lidocaine was infiltrated

underneath the skin.  Next, a micropuncture needle was used to access the right

common femoral artery under direct ultrasound guidance.  Following this, a

microwire was inserted under fluoro guidance.  Next, a small skin incision was

made, needle was removed and a MicroSheath was inserted.  Next, an 0.035 J-wire

was inserted into the abdominal aorta under fluoro guidance.  Following this,

the MicroSheath was removed and a 5-Vietnamese sheath was inserted and appropriately

flushed.  Next, the UF catheter was inserted into the abdominal aorta up to the

level of L1 and then a CO2 abdominal aortogram was performed.



The abdominal aortogram findings are as follows:



The proximal, mid and distal aorta is widely patent.  The right and left common

iliac arteries are widely patent.  There is a high takeoff of the left

hypogastric artery just distal to the aortic bifurcation, which is widely

patent.  The external iliac artery is also widely patent down to the common

femoral artery.  Common femoral artery is widely patent and I can easily see the

takeoff of the bypass graft.



The left lower extremity arterial findings are as follows:



The left common femoral artery is widely patent.  The left profunda femoral

artery is widely patent.  The origin of the left bypass graft is patent at the

common femoral artery.  The bypass graft is widely patent through its proximal

segment and there is a high-grade stenosis in its mid segment likely from a

retained valve body.  Beyond this, the vein graft is widely patent down to the

below knee anastomosis, which is also widely patent.  The patient does have

several AV fistulas which are patent off the vein graft that was utilized.  The

below knee runoff vessels are preserved and patent.



At this point in time, our catheter was used to select the common iliac artery

on the left and ultimately gain access to the external iliac artery.  Following

this, our 5-Vietnamese sheath was exchanged for a 6-Vietnamese straight destination

sheath, which was positioned up and over the bifurcation.  Next, the patient was

heparinized per weight-based protocol.  Following this, I was easily able to

gain access to the proximal portion of the bypass graft with wire access.  Next,

I carefully crossed the very stenotic area when in the mid bypass graft and

confirmed a true lumen access.  Next, the stenotic area was predilated using a 4

x 40 balloon and then using a roadmap, I treated the lesion with a 6 x 40

drug-eluting balloon.  This was inflated to nominal pressure and left in place

for 3 minutes.  Following this, completion angiogram revealed markedly improved

flow through the segment.  There was still some scar from the valve body, but

brisk flow through the segment after ballooning.  This was widely patent and

there was no further waste on our balloon.  I did decide to re-balloon this area

with a 7 x 40 balloon just to ensure that I had adequately treated the area and

indeed there was no waste on the 7 mm balloon after a nominal inflation. 

Satisfied with this result, our wires and catheters were withdrawn and the

sheath was withdrawn into the right iliac system.  The vessel was determined

safe for closure based on CO2.  We did inject a total of 10 mL of normal

contrast, but the remainder of the pictures were performed using carbon dioxide

gas.  Next, our sheath was removed and the StarClose device was inserted and

deployed successfully.  The patient had excellent hemostasis and was transferred

to the post-catheterization area in stable condition.

 



______________________________

BIB FAGAN DO



DR:  LUPILLO/andrea  JOB#:  [id number] / [id number]

DD:  06/24/2019 16:23  DT:  06/24/2019 16:50

## 2019-06-24 NOTE — NUR
Received report from [facility] cath lab.  Pt arrived to the unit at approx 1550.  Checked 
the insertion site with the hand-off nurse, dressing clean, dry, and intact.  Checked pedis 
pulses using a doppler, bilat pulses present.  Cap refill < 3 seconds. Bilat lung sounds 
clear.  Heartbeat regular, S1, S2.  Skin clean, dry, intact.  Pt reporting 0/10 pain.  Pt 
was educated to remain flat with legs extended.  Will continue to monitor pt.

## 2019-06-24 NOTE — PDOC3
Discharge Summary


Visit Information


Date of Admission:  Jun 24, 2019


Date of Discharge:  Jun 24, 2019





Brief Hospital Course


Allergies





                                    Allergies








Coded Allergies Type Severity Reaction Last Updated Verified


 


  No Known Drug Allergies    9/24/18 No








Vital Signs





Vital Signs








  Date Time  Temp Pulse Resp B/P (MAP) Pulse Ox O2 Delivery O2 Flow Rate FiO2


 


6/24/19 18:15  77  119/68 (85) 96   


 


6/24/19 16:02      Room Air  


 


6/24/19 15:38   18     


 


6/24/19 11:01 97.8       





 97.8       








Lab Results





Laboratory Tests








Test


 6/24/19


10:15 6/24/19


17:31


 


White Blood Count


 7.9 x10^3/uL


(4.0-11.0) 





 


Red Blood Count


 4.76 x10^6/uL


(4.30-5.70) 





 


Hemoglobin


 13.8 g/dL


(13.0-17.5) 





 


Hematocrit


 41.6 %


(39.0-53.0) 





 


Mean Corpuscular Volume 87 fL ()  


 


Mean Corpuscular Hemoglobin 29 pg (25-35)  


 


Mean Corpuscular Hemoglobin


Concent 33 g/dL


(31-37) 





 


Red Cell Distribution Width


 15.0 %


(11.5-14.5) 





 


Platelet Count


 255 x10^3/uL


(140-400) 





 


Neutrophils (%) (Auto) 58 % (31-73)  


 


Lymphocytes (%) (Auto) 30 % (24-48)  


 


Monocytes (%) (Auto) 9 % (0-9)  


 


Eosinophils (%) (Auto) 3 % (0-3)  


 


Basophils (%) (Auto) 1 % (0-3)  


 


Neutrophils # (Auto)


 4.6 x10^3uL


(1.8-7.7) 





 


Lymphocytes # (Auto)


 2.3 x10^3/uL


(1.0-4.8) 





 


Monocytes # (Auto)


 0.7 x10^3/uL


(0.0-1.1) 





 


Eosinophils # (Auto)


 0.2 x10^3/uL


(0.0-0.7) 





 


Basophils # (Auto)


 0.1 x10^3/uL


(0.0-0.2) 





 


Prothrombin Time


 12.3 SEC


(11.7-14.0) 





 


Prothromb Time International


Ratio 0.9 (0.8-1.1) 


 





 


Sodium Level


 136 mmol/L


(136-145) 





 


Potassium Level


 4.5 mmol/L


(3.5-5.1) 





 


Chloride Level


 101 mmol/L


() 





 


Carbon Dioxide Level


 25 mmol/L


(21-32) 





 


Anion Gap 10 (6-14)  


 


Blood Urea Nitrogen


 34 mg/dL


(8-26) 





 


Creatinine


 1.8 mg/dL


(0.7-1.3) 





 


Estimated GFR


(Cockcroft-Gault) 38.7 


 





 


BUN/Creatinine Ratio 19 (6-20)  


 


Glucose Level


 221 mg/dL


(70-99) 





 


Calcium Level


 8.6 mg/dL


(8.5-10.1) 





 


Total Bilirubin


 0.5 mg/dL


(0.2-1.0) 





 


Aspartate Amino Transf


(AST/SGOT) 12 U/L (15-37) 


 





 


Alanine Aminotransferase


(ALT/SGPT) 14 U/L (16-63) 


 





 


Alkaline Phosphatase


 128 U/L


() 





 


Total Protein


 7.2 g/dL


(6.4-8.2) 





 


Albumin


 3.4 g/dL


(3.4-5.0) 





 


Albumin/Globulin Ratio 0.9 (1.0-1.7)  


 


Glucose (Fingerstick)


 


 193 mg/dL


(70-99)








Laboratory Tests








Test


 6/24/19


10:15 6/24/19


17:31


 


White Blood Count


 7.9 x10^3/uL


(4.0-11.0) 





 


Red Blood Count


 4.76 x10^6/uL


(4.30-5.70) 





 


Hemoglobin


 13.8 g/dL


(13.0-17.5) 





 


Hematocrit


 41.6 %


(39.0-53.0) 





 


Mean Corpuscular Volume 87 fL ()  


 


Mean Corpuscular Hemoglobin 29 pg (25-35)  


 


Mean Corpuscular Hemoglobin


Concent 33 g/dL


(31-37) 





 


Red Cell Distribution Width


 15.0 %


(11.5-14.5) 





 


Platelet Count


 255 x10^3/uL


(140-400) 





 


Neutrophils (%) (Auto) 58 % (31-73)  


 


Lymphocytes (%) (Auto) 30 % (24-48)  


 


Monocytes (%) (Auto) 9 % (0-9)  


 


Eosinophils (%) (Auto) 3 % (0-3)  


 


Basophils (%) (Auto) 1 % (0-3)  


 


Neutrophils # (Auto)


 4.6 x10^3uL


(1.8-7.7) 





 


Lymphocytes # (Auto)


 2.3 x10^3/uL


(1.0-4.8) 





 


Monocytes # (Auto)


 0.7 x10^3/uL


(0.0-1.1) 





 


Eosinophils # (Auto)


 0.2 x10^3/uL


(0.0-0.7) 





 


Basophils # (Auto)


 0.1 x10^3/uL


(0.0-0.2) 





 


Prothrombin Time


 12.3 SEC


(11.7-14.0) 





 


Prothromb Time International


Ratio 0.9 (0.8-1.1) 


 





 


Sodium Level


 136 mmol/L


(136-145) 





 


Potassium Level


 4.5 mmol/L


(3.5-5.1) 





 


Chloride Level


 101 mmol/L


() 





 


Carbon Dioxide Level


 25 mmol/L


(21-32) 





 


Anion Gap 10 (6-14)  


 


Blood Urea Nitrogen


 34 mg/dL


(8-26) 





 


Creatinine


 1.8 mg/dL


(0.7-1.3) 





 


Estimated GFR


(Cockcroft-Gault) 38.7 


 





 


BUN/Creatinine Ratio 19 (6-20)  


 


Glucose Level


 221 mg/dL


(70-99) 





 


Calcium Level


 8.6 mg/dL


(8.5-10.1) 





 


Total Bilirubin


 0.5 mg/dL


(0.2-1.0) 





 


Aspartate Amino Transf


(AST/SGOT) 12 U/L (15-37) 


 





 


Alanine Aminotransferase


(ALT/SGPT) 14 U/L (16-63) 


 





 


Alkaline Phosphatase


 128 U/L


() 





 


Total Protein


 7.2 g/dL


(6.4-8.2) 





 


Albumin


 3.4 g/dL


(3.4-5.0) 





 


Albumin/Globulin Ratio 0.9 (1.0-1.7)  


 


Glucose (Fingerstick)


 


 193 mg/dL


(70-99)








Brief Hospital Course


Atherosclerosis with left lower extremity claudication s/p  Percutaneous 

endovascular balloon angioplasty of the left mid fem-pop bypass


vein graft using a 6 x 40 Medtronic IN.PACT Admiral drug-eluting balloon, CO2 

abdominal aortogram, Selective CO2 angiogram of the left lower extremity, 

Ultrasound-guided access of the right common femoral artery. Right common 

femoral artery StarClose. tolerated procedure well with no periprocedural 

bleeding. will be discharged on plavix for 30 days with follow scheduled in vasc

clinic. all home meds resumed.





Discharge Information


Condition at Discharge:  Stable


Follow Up:  Weeks (30 days with vasc sx clinic)


Disposition/Orders:  D/C to Home


Scheduled


Aspirin (Aspirin) 81 Mg Tab.chew, 1 TAB PO DAILY, #30 Ref 3 (Reported)


   Entered as Reported by: VIVIENNE CHAPA on 10/27/15 1812


   Last Action: Reviewed on 6/24/19 1015 by LETICIA HUTCHINS


Atorvastatin Calcium (Atorvastatin Calcium) 80 Mg Tablet, 1 TAB PO DAILY for 

Cholesterol, #30 Ref 5 (Reported)


   Entered as Reported by: LETICIA HUTCHINS on 6/24/19 1015


   Last Taken: Unknown Dose on 6/23/19      Last Action: New Order on 6/24/19 1015 by LETICIA HUTCHINS


Clopidogrel Bisulfate (Clopidogrel) 75 Mg Tablet, 75 MG PO DAILYWBKFT for pvd 

for 30 Days, #30


   Prescribed by: LANDEN DAVALOS MD on 6/24/19 1828


Duloxetine Hcl (Cymbalta) 30 Mg Capsule.dr, 30 MG PO QHS, (Reported)


   Entered as Reported by: AIDE ROSS on 8/30/18 0650


   Last Action: Reviewed on 6/24/19 1015 by LETICIA HUTCHINS


Gabapentin (Gabapentin) 800 Mg Tablet, 800 MG PO Q8HRS for NEUROGENIC PAIN, 

(Reported)


   Entered as Reported by: LETICIA HUTCHINS on 6/24/19 1015


   Last Action: New Order on 6/24/19 1015 by LETICIA HUTCHINS


Glipizide (Glipizide) 10 Mg Tablet, 1 TAB PO BID, #180 Ref 3 (Reported)


   Entered as Reported by: AIDE ROSS on 8/30/18 0650


   Last Action: Reviewed on 6/24/19 1015 by LETICIA HUTCHINS


Lisinopril (Lisinopril) 20 Mg Tablet, 1 TAB PO DAILY for HTN, #30 Ref 5 

(Reported)


   Entered as Reported by: LETICIA HUTCHINS on 6/24/19 1015


   Last Action: New Order on 6/24/19 1015 by LETICIA HUTCHINS


Mupirocin (Mupirocin Ointment) 22 Gm Oint...g., 1 JOSE TP DAILY for WOUND CARE, 

#1 (Reported)


   Entered as Reported by: LETICIA HUTCHINS on 6/24/19 1015


   Last Action: New Order on 6/24/19 1015 by LETICIA HUTCHINS


Nystatin (Nystatin) 15 Gm Powder, 1 JOSE TP BID for left groin, #1 (Reported)


   Entered as Reported by: LETICIA HUTCHINS on 6/24/19 1015


   Last Action: New Order on 6/24/19 1015 by LETICIA HUTCHINS


Pentoxifylline (Pentoxifylline) 400 Mg Tablet.er, 400 MG PO TID, (Reported)


   Entered as Reported by: AIDE ROSS on 8/30/18 0650


   Last Action: Reviewed on 6/24/19 1015 by LETICIA HUTCHINS


Sildenafil Citrate (Viagra) 100 Mg Tablet, 1 TAB PO PRN DAILY for RX, #6 Ref 11 

(Reported)


   Entered as Reported by: LETICIA HUTCHINS on 6/24/19 1015


   Last Action: New Order on 6/24/19 1015 by LETICIA HUTCHINS


Sitagliptin Phos/Metformin Hcl (Janumet 50-1,000 Mg Tablet) 1 Each Tablet, 1 TAB

PO BID, #180 Ref 3 (Reported)


   Entered as Reported by: AIDE ROSS on 8/30/18 0650


   Last Action: Reviewed on 6/24/19 1015 by LANDEN DOTSON MD              Jun 24, 2019 18:42

## 2019-12-04 ENCOUNTER — HOSPITAL ENCOUNTER (EMERGENCY)
Dept: HOSPITAL 61 - ER | Age: 59
Discharge: HOME | End: 2019-12-04
Payer: COMMERCIAL

## 2019-12-04 VITALS — HEIGHT: 70 IN | WEIGHT: 195 LBS | BODY MASS INDEX: 27.92 KG/M2

## 2019-12-04 VITALS — DIASTOLIC BLOOD PRESSURE: 84 MMHG | SYSTOLIC BLOOD PRESSURE: 157 MMHG

## 2019-12-04 DIAGNOSIS — K05.00: Primary | ICD-10-CM

## 2019-12-04 DIAGNOSIS — I11.9: ICD-10-CM

## 2019-12-04 DIAGNOSIS — E78.00: ICD-10-CM

## 2019-12-04 DIAGNOSIS — Z95.1: ICD-10-CM

## 2019-12-04 DIAGNOSIS — F32.9: ICD-10-CM

## 2019-12-04 DIAGNOSIS — I25.2: ICD-10-CM

## 2019-12-04 DIAGNOSIS — K08.89: ICD-10-CM

## 2019-12-04 DIAGNOSIS — E11.40: ICD-10-CM

## 2019-12-04 PROCEDURE — 99283 EMERGENCY DEPT VISIT LOW MDM: CPT

## 2019-12-04 NOTE — PHYS DOC
Past Medical History


Past Medical History:  Depression, Diabetes-Type II, High Cholesterol, Heart 

Disease, Hypertension, MI


Additional Past Medical Histor:  Neuropathy


Past Surgical History:  Coronary Bypass Surgery


Additional Past Surgical Histo:  FEMORAL POPITEAL BYPASS; 5 BYPASS


Alcohol Use:  None


Drug Use:  None





Adult General


Chief Complaint


Chief Complaint:  DENTAL PROBLEM





HPI


HPI





Patient is a 59  year old male who presents to the emergency department with 

complaints of frontal upper gingival pain for the last 5-6 weeks. PT states that

the pain increases when he is eating. He reports that he has a permanent bridge 

in the upper palate. Patient currently rates his pain 8 out of 10 on the pain 

scale, he denies any alleviating factors. Patient denies any purulent drainage, 

or bleeding from the area. He denies any recent fever, ear pain, cough, 

shortness of breath, nausea, vomiting, diarrhea, or abdominal pain. Patient st

ates the pain shoots to just below his right nare. All other ROS is neg unless 

otherwise noted in HPI.





Review of Systems


Review of Systems


See Above





Allergies


Allergies





Allergies








Coded Allergies Type Severity Reaction Last Updated Verified


 


  No Known Drug Allergies    9/24/18 No











Physical Exam


Physical Exam





Constitutional: Well developed, well nourished, no acute distress, non-toxic 

appearance. []


HENT: Normocephalic, atraumatic, bilateral external ears normal, oropharynx 

moist, no oral exudates, nose normal; permanent upper dental bridge, upper 

frontal gingival edema and erythema that is tender to palpation, no visible 

abscess, no fluctuance []


Eyes: PERRLA, EOMI, conjunctiva normal, no discharge. [] 


Neck: Normal range of motion, no stridor. [] 


Cardiovascular:Heart rate regular rhythm


Lungs & Thorax: Respirations even and unlabored, no retractions, no respiratory 

distress


Skin: Warm, dry, no erythema, no rash. [] 


Extremities: No cyanosis, ROM intact


Neurologic: Alert and oriented X 3, no focal deficits noted. []


Psychologic: Affect normal, judgement normal, mood normal. []





Current Patient Data


Vital Signs





                                   Vital Signs








  Date Time  Temp Pulse Resp B/P (MAP) Pulse Ox O2 Delivery O2 Flow Rate FiO2


 


12/4/19 11:25 98.3 92 18 157/84 (108) 99 Room Air  





 98.3       











EKG


EKG


[]





Radiology/Procedures


Radiology/Procedures


[]





Course & Med Decision Making


Course & Med Decision Making


Pertinent Labs and Imaging studies reviewed. (See chart for details)


Patient is a 59-year-old male who presented to the emergency room with 

complaints of a, pain and swelling for the last 5-6 weeks. Patient had a 

permanent upper frontal bridge. There is no visible abscess or palpable pocket 

of fluid. The pain radiated to just below the right Cristina, there is no warmth or 

erythema of the upper lip. We'll prescribe Keflex naproxen, and Peridex. Advised

 the patient that he needs to follow up with a dentist for further evaluation 

and treatment, return to the emergency room if symptoms are not any better after

 1-2 days.


Pt verbalized an understanding of home care, medications, follow-up, and return 

to ED instructions and was in agreement with the plan of care.


[]





Dragon Disclaimer


Dragon Disclaimer


This electronic medical record was generated, in whole or in part, using a voice

 recognition dictation system.





Departure


Departure


Impression:  


   Primary Impression:  


   Gingivitis, acute


   Additional Impression:  


   Dentalgia


Disposition:  01 HOME, SELF-CARE


Condition:  STABLE


Referrals:  


NO PCP (PCP)


Patient Instructions:  Dental Pain, Easy-to-Read, Gingivitis, Easy-to-Read





Additional Instructions:  


Fill prescription(s) and use as directed. Follow up with dentist using the 

referral list provided. Return to the ER if symptoms worsen.


Scripts


Cephalexin (CEPHALEXIN) 500 Mg Capsule


1 CAP PO TID for 7 Days, #21 CAP 0 Refills


   Prov: ARACELI KHAN         12/4/19 


Chlorhexidine Gluconate (PERIDEX) 15 Ml Mouthwash


15 ML SWSP BID for 7 Days, #473 ML 0 Refills


   Brush teeth before using to prevent staining. Swish for


   approximately 30 seconds before spitting.


   Prov: ARACEIL KHAN         12/4/19 


Naproxen (NAPROXEN) 500 Mg Tablet


1 TAB PO BID PRN for PAIN for 10 Days, #20 TAB 0 Refills


   Prov: ARACELI KHAN         12/4/19





Problem Qualifiers











ARACELI KHAN        Dec 4, 2019 12:10

## 2020-04-06 ENCOUNTER — HOSPITAL ENCOUNTER (EMERGENCY)
Dept: HOSPITAL 61 - ER | Age: 60
Discharge: HOME | End: 2020-04-06
Payer: COMMERCIAL

## 2020-04-06 VITALS — DIASTOLIC BLOOD PRESSURE: 70 MMHG | SYSTOLIC BLOOD PRESSURE: 154 MMHG

## 2020-04-06 VITALS — WEIGHT: 200.4 LBS | HEIGHT: 70 IN | BODY MASS INDEX: 28.69 KG/M2

## 2020-04-06 DIAGNOSIS — Z98.890: ICD-10-CM

## 2020-04-06 DIAGNOSIS — M71.21: Primary | ICD-10-CM

## 2020-04-06 DIAGNOSIS — F32.9: ICD-10-CM

## 2020-04-06 DIAGNOSIS — E78.00: ICD-10-CM

## 2020-04-06 DIAGNOSIS — I25.2: ICD-10-CM

## 2020-04-06 DIAGNOSIS — I11.9: ICD-10-CM

## 2020-04-06 DIAGNOSIS — E11.40: ICD-10-CM

## 2020-04-06 DIAGNOSIS — Z87.891: ICD-10-CM

## 2020-04-06 DIAGNOSIS — M79.661: ICD-10-CM

## 2020-04-06 LAB
ALBUMIN SERPL-MCNC: 3.5 G/DL (ref 3.4–5)
ALBUMIN/GLOB SERPL: 1 {RATIO} (ref 1–1.7)
ALP SERPL-CCNC: 98 U/L (ref 46–116)
ALT SERPL-CCNC: 15 U/L (ref 16–63)
ANION GAP SERPL CALC-SCNC: 10 MMOL/L (ref 6–14)
AST SERPL-CCNC: 21 U/L (ref 15–37)
BASOPHILS # BLD AUTO: 0 X10^3/UL (ref 0–0.2)
BASOPHILS NFR BLD: 0 % (ref 0–3)
BILIRUB SERPL-MCNC: 0.3 MG/DL (ref 0.2–1)
BUN SERPL-MCNC: 21 MG/DL (ref 8–26)
BUN/CREAT SERPL: 15 (ref 6–20)
CALCIUM SERPL-MCNC: 8.4 MG/DL (ref 8.5–10.1)
CHLORIDE SERPL-SCNC: 104 MMOL/L (ref 98–107)
CO2 SERPL-SCNC: 24 MMOL/L (ref 21–32)
CREAT SERPL-MCNC: 1.4 MG/DL (ref 0.7–1.3)
EOSINOPHIL NFR BLD: 0.1 X10^3/UL (ref 0–0.7)
EOSINOPHIL NFR BLD: 1 % (ref 0–3)
ERYTHROCYTE [DISTWIDTH] IN BLOOD BY AUTOMATED COUNT: 16.1 % (ref 11.5–14.5)
GFR SERPLBLD BASED ON 1.73 SQ M-ARVRAT: 51.5 ML/MIN
GLOBULIN SER-MCNC: 3.5 G/DL (ref 2.2–3.8)
GLUCOSE SERPL-MCNC: 263 MG/DL (ref 70–99)
HCT VFR BLD CALC: 43.5 % (ref 39–53)
HGB BLD-MCNC: 13.6 G/DL (ref 13–17.5)
LYMPHOCYTES # BLD: 2.3 X10^3/UL (ref 1–4.8)
LYMPHOCYTES NFR BLD AUTO: 31 % (ref 24–48)
MCH RBC QN AUTO: 24 PG (ref 25–35)
MCHC RBC AUTO-ENTMCNC: 31 G/DL (ref 31–37)
MCV RBC AUTO: 78 FL (ref 79–100)
MONO #: 0.7 X10^3/UL (ref 0–1.1)
MONOCYTES NFR BLD: 9 % (ref 0–9)
NEUT #: 4.5 X10^3/UL (ref 1.8–7.7)
NEUTROPHILS NFR BLD AUTO: 59 % (ref 31–73)
PLATELET # BLD AUTO: 253 X10^3/UL (ref 140–400)
POTASSIUM SERPL-SCNC: 5.1 MMOL/L (ref 3.5–5.1)
PROT SERPL-MCNC: 7 G/DL (ref 6.4–8.2)
PROTHROMBIN TIME: 12.3 SEC (ref 11.7–14)
RBC # BLD AUTO: 5.56 X10^6/UL (ref 4.3–5.7)
SODIUM SERPL-SCNC: 138 MMOL/L (ref 136–145)
WBC # BLD AUTO: 7.6 X10^3/UL (ref 4–11)

## 2020-04-06 PROCEDURE — 73564 X-RAY EXAM KNEE 4 OR MORE: CPT

## 2020-04-06 PROCEDURE — 85025 COMPLETE CBC W/AUTO DIFF WBC: CPT

## 2020-04-06 PROCEDURE — 85610 PROTHROMBIN TIME: CPT

## 2020-04-06 PROCEDURE — 93971 EXTREMITY STUDY: CPT

## 2020-04-06 PROCEDURE — 80053 COMPREHEN METABOLIC PANEL: CPT

## 2020-04-06 PROCEDURE — 29505 APPLICATION LONG LEG SPLINT: CPT

## 2020-04-06 PROCEDURE — 36415 COLL VENOUS BLD VENIPUNCTURE: CPT

## 2020-04-06 PROCEDURE — 99285 EMERGENCY DEPT VISIT HI MDM: CPT

## 2020-04-06 PROCEDURE — 93005 ELECTROCARDIOGRAM TRACING: CPT

## 2020-04-06 NOTE — RAD
KNEE RIGHT 4V 

 

DATE:  4/6/2020 3:19 PM

 

INDICATION:  Knee pain  

 

COMPARISON:  None.

 

FINDINGS:

 

Bones: There is no evidence of acute fracture or dislocation.

 

Joints:  Mild medial and patellofemoral compartment degenerative changes. 

There is no joint effusion. 

 

Miscellaneous: None.

 

IMPRESSION:  

 

No acute osseous abnormality. Mild degenerative changes.

 

Electronically signed by: Vincenzo Welch MD (4/6/2020 4:34 PM) [id number]

## 2020-04-06 NOTE — RAD
Right lower extremity venous duplex study 4/6/2020 3:48 PM

 

Clinical History:  Right lower extremity pain

 

Technique: Using a combination of real time ultrasound imaging and 

color-flow and pulse Doppler imaging techniques, including spectral 

analysis, graded compression and augmentation, duplex evaluation of the 

deep venous system of the right lower extremity was performed. Multiple 

images were obtained.

 

Findings: There is no sonographic evidence of deep venous thrombosis 

involving the visualized deep venous structures of the  right  lower 

extremity. There is a nonspecific fluid collection abutting the right 

popliteal fossa measuring 4.7 x 0.9 x 4.4 cm. 

 

 

IMPRESSION: 

1.No evidence of right lower extremity deep venous thrombosis

 

2. Fluid collection abutting the right popliteal fossa which could 

represent a Baker's cyst.  If there is history of surgery. A seroma is 

possible. There is evidence of acute infection, abscess could be 

considered.

 

Electronically signed by: Elie Singh MD (4/6/2020 3:53 PM) [id number]

## 2020-04-06 NOTE — PHYS DOC
Past Medical History


Past Medical History:  Depression, Diabetes-Type II, High Cholesterol, Heart 

Disease, Hypertension, MI


Additional Past Medical Histor:  Neuropathy


Past Surgical History:  Coronary Bypass Surgery


Additional Past Surgical Histo:  FEMORAL POPITEAL BYPASS; 5 BYPASS


Smoking Status:  Former Smoker


Alcohol Use:  None


Drug Use:  None





Adult General


Chief Complaint


Chief Complaint:  LOWER EXT PAIN





HPI


HPI





Patient is a 59  year old male who presents with right anterior knee pain and 

right calf pain that is worsened over the last 3 months.  He states is hard for 

him to walk on it.  When I asked him to explain the pain he states he cannot 

explain the way that it feels.  He denies coolness of the extremity or skin 

color changes.  Rates his pain a 7 out of 10 especially when walking.





Review of Systems


Review of Systems








Musculoskeletal: Denies back pain.  Right knee and calf joint pain []





All other systems were reviewed and found to be within normal limits, except as 

documented in this note.





Allergies


Allergies





Allergies








Coded Allergies Type Severity Reaction Last Updated Verified


 


  No Known Drug Allergies    18 No











Physical Exam


Physical Exam





Constitutional: Well developed, well nourished, no acute distress, non-toxic 

appearance. []


HENT: Normocephalic, atraumatic, bilateral external ears normal, oropharynx 

moist, no oral exudates, nose normal. []


Eyes: PERRLA, EOMI, conjunctiva normal, no discharge. [] 


Neck: Normal range of motion, no tenderness, supple, no stridor. [] 


Cardiovascular:Heart rate regular rhythm, no murmur []


Lungs & Thorax:  Bilateral breath sounds clear to auscultation []


Abdomen: Bowel sounds normal, soft, no tenderness, no masses, no pulsatile 

masses. [] 


Skin: Warm, dry, no erythema, no rash. [] 


Back: No tenderness, no CVA tenderness. [] 


Extremities: Right calf tenderness, no cyanosis, no clubbing, ROM intact, no 

edema. [] 


Neurologic: Alert and oriented X 3, normal motor function, normal sensory f

unction, no focal deficits noted. []


Psychologic: Affect normal, judgement normal, mood normal. []





Current Patient Data


Vital Signs





                                   Vital Signs








  Date Time  Temp Pulse Resp B/P (MAP) Pulse Ox O2 Delivery O2 Flow Rate FiO2


 


20 15:00 97.7 97 17 157/93 (114) 99 Room Air  





 97.7       








Lab Values





                                Laboratory Tests








Test


 4/6/20


15:15


 


White Blood Count


 7.6 x10^3/uL


(4.0-11.0)


 


Red Blood Count


 5.56 x10^6/uL


(4.30-5.70)


 


Hemoglobin


 13.6 g/dL


(13.0-17.5)


 


Hematocrit


 43.5 %


(39.0-53.0)


 


Mean Corpuscular Volume


 78 fL ()


L


 


Mean Corpuscular Hemoglobin


 24 pg (25-35)


L


 


Mean Corpuscular Hemoglobin


Concent 31 g/dL


(31-37)


 


Red Cell Distribution Width


 16.1 %


(11.5-14.5)  H


 


Platelet Count


 253 x10^3/uL


(140-400)


 


Neutrophils (%) (Auto) 59 % (31-73)  


 


Lymphocytes (%) (Auto) 31 % (24-48)  


 


Monocytes (%) (Auto) 9 % (0-9)  


 


Eosinophils (%) (Auto) 1 % (0-3)  


 


Basophils (%) (Auto) 0 % (0-3)  


 


Neutrophils # (Auto)


 4.5 x10^3/uL


(1.8-7.7)


 


Lymphocytes # (Auto)


 2.3 x10^3/uL


(1.0-4.8)


 


Monocytes # (Auto)


 0.7 x10^3/uL


(0.0-1.1)


 


Eosinophils # (Auto)


 0.1 x10^3/uL


(0.0-0.7)


 


Basophils # (Auto)


 0.0 x10^3/uL


(0.0-0.2)


 


Prothrombin Time


 12.3 SEC


(11.7-14.0)


 


Prothrombin Time INR 1.0 (0.8-1.1)  


 


Sodium Level


 138 mmol/L


(136-145)


 


Potassium Level


 5.1 mmol/L


(3.5-5.1)


 


Chloride Level


 104 mmol/L


()


 


Carbon Dioxide Level


 24 mmol/L


(21-32)


 


Anion Gap 10 (6-14)  


 


Blood Urea Nitrogen


 21 mg/dL


(8-26)


 


Creatinine


 1.4 mg/dL


(0.7-1.3)  H


 


Estimated GFR


(Cockcroft-Gault) 51.5  





 


BUN/Creatinine Ratio 15 (6-20)  


 


Glucose Level


 263 mg/dL


(70-99)  H


 


Calcium Level


 8.4 mg/dL


(8.5-10.1)  L


 


Total Bilirubin


 0.3 mg/dL


(0.2-1.0)


 


Aspartate Amino Transferase


(AST) 21 U/L (15-37)





 


Alanine Aminotransferase (ALT)


 15 U/L (16-63)


L


 


Alkaline Phosphatase


 98 U/L


()


 


Total Protein


 7.0 g/dL


(6.4-8.2)


 


Albumin


 3.5 g/dL


(3.4-5.0)


 


Albumin/Globulin Ratio 1.0 (1.0-1.7)  





                                Laboratory Tests


20 15:15








                                Laboratory Tests


20 15:15











EKG


EKG


Sinus Rhythm and no STEMI[]


Interpretation Time:


1511 and read by Dr Mendoza





Radiology/Procedures


Radiology/Procedures


[]


Impressions:


[facility]


                    [address] [place] [address]


                                 [phone]


                                        


                                 IMAGING REPORT





                                     Signed





PATIENT: ALEXEI SAMS ACCOUNT: [id number]     MRN#: [id number]


: 1960           LOCATION: ER              AGE: 59


SEX: M                    EXAM DT: 20         ACCESSION#: [id number]


STATUS: REG ER            ORD. PHYSICIAN: ANALI REYES


REASON: calf pain


PROCEDURE: VENOUS LOWER EXTREMITY RIGHT





 


Right lower extremity venous duplex study 2020 3:48 PM


 


Clinical History:  Right lower extremity pain


 


Technique: Using a combination of real time ultrasound imaging and 


color-flow and pulse Doppler imaging techniques, including spectral 


analysis, graded compression and augmentation, duplex evaluation of the 


deep venous system of the right lower extremity was performed. Multiple 


images were obtained.


 


Findings: There is no sonographic evidence of deep venous thrombosis 


involving the visualized deep venous structures of the  right  lower 


extremity. There is a nonspecific fluid collection abutting the right 


popliteal fossa measuring 4.7 x 0.9 x 4.4 cm. 


 


 


IMPRESSION: 


1.No evidence of right lower extremity deep venous thrombosis


 


2. Fluid collection abutting the right popliteal fossa which could 


represent a Baker's cyst.  If there is history of surgery. A seroma is 


possible. There is evidence of acute infection, abscess could be 


considered.


 


Electronically signed by: Elie Rodriguez MD (2020 3:53 PM) [id number]














DICTATED and SIGNED BY:     ELIE RODRIGUEZ MD


DATE:     20 1553








Course & Med Decision Making


Course & Med Decision Making


Pertinent Labs and Imaging studies reviewed. (See chart for details)





No swelling of the extremity.  Skin pink warm and dry.  Cap refill less than 3 

seconds.  Full range of motion of the knee.  Patient is ambulatory with a steady

 gait.  Equal strength and movement in all extremities.  Calf tenderness with 

palpation.  No tenderness over anterior knee with palpation.  No joint swelling 

or deformities or redness or heat.  Patient has a history of MI, coronary 

bypasses, diabetes, hypertension, high cholesterol.  Patient denies chest pain, 

shortness of air, abdominal pain, nausea, vomiting, diarrhea, fever, dizziness, 

headache, vision changes.  Patient states that he does have normal neuropathies 

in his bilateral lower feet and that is normal for him from his diabetes.  He 

denies any injury to his extremity or his knee.











[]





Dragon Disclaimer


Dragon Disclaimer


This electronic medical record was generated, in whole or in part, using a voice

 recognition dictation system.





Departure


Departure


Impression:  


   Primary Impression:  


   Baker's cyst of knee


Disposition:   HOME, SELF-CARE


Condition:  STABLE


Referrals:  


NO PCP (PCP)








BRIDGETTE GAO II, MD


Patient Instructions:  Baker's Cyst





Additional Instructions:  


Follow up orthopedics. Try using a heating pad for pain.


Scripts


Hydrocodone/Apap 5-325 (NORCO 5-325 TABLET) 1 Each Tablet


1 TAB PO PRN Q6HRS PRN for PAIN, #10 TAB 0 Refills


   Prov: ANALI REYES         20





Problem Qualifiers








   Primary Impression:  


   Baker's cyst of knee


   Laterality:  right  Qualified Codes:  M71.21 - Synovial cyst of popliteal 

   space [Aparicio], right knee








ANALI REYES APRN             2020 15:25

## 2020-04-06 NOTE — EKG
[facility] acute hospital

              [address] [place] [address]

Test Date:    2020               Test Time:    15:11:02

Pat Name:     ALEXEI SAMS         Department:   

Patient ID:   [id number]           Room:          

Gender:       M                        Technician:   

:          1960               Requested By: ANALI REYES

Order Number: [id number]           Reading MD:   Jose Roberto Quintana

                                 Measurements

Intervals                              Axis          

Rate:         92                       P:            39

CA:           150                      QRS:          45

QRSD:         84                       T:            43

QT:           320                                    

QTc:          400                                    

                           Interpretive Statements

SINUS RHYTHM

NONSPECIFIC ST-T WAVE CHANGES

SEPTAL Q WAVE.



Electronically Signed On 2020 9:38:14 CDT by Jose Roberto Quintana

## 2020-06-28 ENCOUNTER — HOSPITAL ENCOUNTER (EMERGENCY)
Dept: HOSPITAL 61 - ER | Age: 60
Discharge: HOME | End: 2020-06-28
Payer: COMMERCIAL

## 2020-06-28 VITALS — DIASTOLIC BLOOD PRESSURE: 80 MMHG | SYSTOLIC BLOOD PRESSURE: 158 MMHG

## 2020-06-28 VITALS — WEIGHT: 200.4 LBS | HEIGHT: 70 IN | BODY MASS INDEX: 28.69 KG/M2

## 2020-06-28 DIAGNOSIS — Z87.891: ICD-10-CM

## 2020-06-28 DIAGNOSIS — Z98.890: ICD-10-CM

## 2020-06-28 DIAGNOSIS — F32.9: ICD-10-CM

## 2020-06-28 DIAGNOSIS — E11.40: ICD-10-CM

## 2020-06-28 DIAGNOSIS — I11.9: ICD-10-CM

## 2020-06-28 DIAGNOSIS — I25.2: ICD-10-CM

## 2020-06-28 DIAGNOSIS — E78.00: ICD-10-CM

## 2020-06-28 DIAGNOSIS — L03.032: Primary | ICD-10-CM

## 2020-06-28 PROCEDURE — 73630 X-RAY EXAM OF FOOT: CPT

## 2020-06-28 PROCEDURE — 99283 EMERGENCY DEPT VISIT LOW MDM: CPT

## 2020-06-28 NOTE — PHYS DOC
Past Medical History


Past Medical History:  Depression, Diabetes-Type II, High Cholesterol, Heart 

Disease, Hypertension, MI, Other


Additional Past Medical Histor:  Neuropathy


 (ANALI REYES APRN)


Past Surgical History:  Coronary Bypass Surgery


Additional Past Surgical Histo:  FEMORAL POPITEAL BYPASS; 5 BYPASS


 (ANALI REYES APRN)


Smoking Status:  Former Smoker


Alcohol Use:  None


Drug Use:  None


 (ANALI REYES APRN)





General Adult


EDM:


Chief Complaint:  TOE PROBLEM





HPI:


HPI:





Patient is a 59  year old male who presents with 5 days of left great toe 

redness.  He states that he noticed that the toenail turned a blue color and 

that he squeezed it and blood and purulent fluid came out from underneath it.  

He states he does not remember stubbing the toe or injuring it.  He does have 

neuropathy in his feet from his diabetes.  He goes to [facility] for his 

primary care and to get all of his medications.  He denies pain but again he has

neuropathy.  He denies fever, body aches, nausea, vomiting, diarrhea, chest 

pain, shortness of air, headache, dizziness, cough.  He is ambulatory with a 

steady gait.  There is redness around the cuticle of the toe but the toe itself 

is not swollen.  The toenail itself is a fall be yellow color.  Pedal pulses 

strong and present.  The foot is not swollen.  Patient denies pain.


 (ANALI REYES APRN)





Review of Systems:


Review of Systems:


Constitutional:   Denies fever or chills. []


Eyes:   Denies change in visual acuity. []


HENT:   Denies nasal congestion or sore throat. [] 


Respiratory:   Denies cough or shortness of breath. [] 


Cardiovascular:   Denies chest pain or edema. [] 


GI:   Denies abdominal pain, nausea, vomiting, bloody stools or diarrhea. [] 


:  Denies dysuria. [] 


Musculoskeletal:   Denies back pain or joint pain. [] 


Integument:   Denies rash.  Redness of cuticle of toe and toenail yellowing.  []

 


Neurologic:   Denies headache, focal weakness or sensory changes. [] 


Endocrine:   Denies polyuria or polydipsia. [] 


Lymphatic:  Denies swollen glands. [] 


Psychiatric:  Denies depression or anxiety. []


 (ANALI REYES APRN)





Heart Score:


Risk Factors:


Risk Factors:  DM, Current or recent (<one month) smoker, HTN, HLP, family 

history of CAD, obesity.


Risk Scores:


Score 0 - 3:  2.5% MACE over next 6 weeks - Discharge Home


Score 4 - 6:  20.3% MACE over next 6 weeks - Admit for Clinical Observation


Score 7 - 10:  72.7% MACE over next 6 weeks - Early Invasive Strategies


 (ANALI REYES)





Allergies:


Allergies:





Allergies








Coded Allergies Type Severity Reaction Last Updated Verified


 


  No Known Drug Allergies    18 No








 (ANALI REYES)





Physical Exam:


PE:





Constitutional: Well developed, well nourished, no acute distress, non-toxic 

appearance. []


HENT: Normocephalic, atraumatic, bilateral external ears normal, oropharynx 

moist, no oral exudates, nose normal. []


Eyes: PERRLA, EOMI, conjunctiva normal, no discharge. [] 


Neck: Normal range of motion, no tenderness, supple, no stridor. [] 


Cardiovascular:Heart rate regular rhythm, no murmur []


Lungs & Thorax:  Bilateral breath sounds clear to auscultation []


Abdomen: Bowel sounds normal, soft, no tenderness, no masses, no pulsatile 

masses. [] 


Skin: Warm, dry,  erythema around left great toe cuticle, no rash. [] 


Back: No tenderness, no CVA tenderness. [] 


Extremities: No tenderness, no cyanosis, no clubbing, ROM intact, no edema. [] 


Neurologic: Alert and oriented X 3, normal motor function, normal sensory 

function, no focal deficits noted. []


Psychologic: Affect normal, judgement normal, mood normal. []


 (ANALI REYES)





EKG:


EKG:


[]


 (ANALI REYES)





Radiology/Procedures:


Radiology/Procedures:


[]


Impression:


                            [facility]


                    [address] [place] [address] [phone]


                                        


                                 IMAGING REPORT





                                     Signed





PATIENT: ALEXEI SAMS ACCOUNT: [id number]     MRN#: [id number]


: 1960           LOCATION: ER              AGE: 59


SEX: M                    EXAM DT: 20         ACCESSION#: [id number]


STATUS: REG ER            ORD. PHYSICIAN: ANALI REYES


REASON: pain, infection


PROCEDURE: FOOT LEFT 3V





PROCEDURE: FOOT LEFT 3V


 


STUDY DATE: 2020


 


CLINICAL INDICATION / HISTORY: Reason: pain, infection / Spl. 


Instructions:  / History: .


 


TECHNIQUE: AP, lateral and oblique views of the left foot.


 


COMPARISON: None


 


FINDINGS: No fracture or dislocation is identified. The bone density is 


normal. The joint space widths are maintained, and there are no erosions 


to suggest an inflammatory arthropathy. No soft tissue abnormality is 


seen.


 


IMPRESSION: No acute osseous abnormality.


 


Electronically signed by: Brandon Spaulding MD (2020 1:42 PM) 


[id number]














DICTATED and SIGNED BY:     BRANDON SPAULDING MD


DATE:     20 1342


 (ANALI REYES)





Course & Med Decision Making:


Course & Med Decision Making


Pertinent Labs and Imaging studies reviewed. (See chart for details)





See HPI.  Patient can wiggle all of his toes.  No deformity to any joints or to 

the toe.  There is no drainage.  Since patient is diabetic I will go ahead and 

x-ray the toe and put him on antibiotic.  Patient has a history of high 

cholesterol, heart disease, hypertension, MI, depression, diabetes, coronary 

bypass.  States he is been taking all of his medications and his blood sugars 

have been running appropriately.





[]


 (ANALI REYES)





Dragon Disclaimer:


Dragon Disclaimer:


This electronic medical record was generated, in whole or in part, using a voice

 recognition dictation system.


 (ANALI REYES)





Departure


Departure


Impression:  


   Primary Impression:  


   Paronychia


Disposition:  01 HOME, SELF-CARE


Condition:  STABLE


Referrals:  


NO PCP (PCP)


Patient Instructions:  Paronychia





Additional Instructions:  


Follow-up with your primary care provider.  You can soak your foot in warm water

 with Epson salt 3 times a day.  Keep covered and protected.  If the toe worsens

 come back to the emergency room.


Scripts


Cephalexin (KEFLEX) 500 Mg Capsule


1 CAP PO TID for 10 Days, #30 CAP 0 Refills


   Prov: ANALI REYES         20





Justicifation of Admission Dx:


Justifications for Admission:


Justification of Admission Dx:  N/A


 (ANALI REYES)





Attending Signature


Attending Signature


I have participated in the care of this patient and I have reviewed and agree 

with all pertinent clinical information above including history, exam, and 

recommendations.





 (WILLIAM IGNACIO DO)











ANALI REYES            2020 13:34


WILLIAM IGNACIO DO           2020 16:37

## 2020-06-28 NOTE — RAD
PROCEDURE: FOOT LEFT 3V

 

STUDY DATE: 6/28/2020

 

CLINICAL INDICATION / HISTORY: Reason: pain, infection / Spl. 

Instructions:  / History: .

 

TECHNIQUE: AP, lateral and oblique views of the left foot.

 

COMPARISON: None

 

FINDINGS: No fracture or dislocation is identified. The bone density is 

normal. The joint space widths are maintained, and there are no erosions 

to suggest an inflammatory arthropathy. No soft tissue abnormality is 

seen.

 

IMPRESSION: No acute osseous abnormality.

 

Electronically signed by: Marcy Spaulding MD (6/28/2020 1:42 PM) 

[id number]

## 2021-05-28 ENCOUNTER — HOSPITAL ENCOUNTER (EMERGENCY)
Dept: HOSPITAL 61 - ER | Age: 61
Discharge: HOME | End: 2021-05-28
Payer: MEDICARE

## 2021-05-28 VITALS — WEIGHT: 195.33 LBS | HEIGHT: 70 IN | BODY MASS INDEX: 27.96 KG/M2

## 2021-05-28 VITALS — DIASTOLIC BLOOD PRESSURE: 85 MMHG | SYSTOLIC BLOOD PRESSURE: 146 MMHG

## 2021-05-28 DIAGNOSIS — E11.40: ICD-10-CM

## 2021-05-28 DIAGNOSIS — I73.9: Primary | ICD-10-CM

## 2021-05-28 DIAGNOSIS — Z95.1: ICD-10-CM

## 2021-05-28 DIAGNOSIS — E78.00: ICD-10-CM

## 2021-05-28 DIAGNOSIS — I10: ICD-10-CM

## 2021-05-28 DIAGNOSIS — Z87.891: ICD-10-CM

## 2021-05-28 LAB
ANION GAP SERPL CALC-SCNC: 11 MMOL/L (ref 6–14)
BASOPHILS # BLD AUTO: 0.1 X10^3/UL (ref 0–0.2)
BASOPHILS NFR BLD: 1 % (ref 0–3)
BUN SERPL-MCNC: 35 MG/DL (ref 8–26)
CALCIUM SERPL-MCNC: 8.8 MG/DL (ref 8.5–10.1)
CHLORIDE SERPL-SCNC: 99 MMOL/L (ref 98–107)
CO2 SERPL-SCNC: 24 MMOL/L (ref 21–32)
CREAT SERPL-MCNC: 1.4 MG/DL (ref 0.7–1.3)
EOSINOPHIL NFR BLD: 0.1 X10^3/UL (ref 0–0.7)
EOSINOPHIL NFR BLD: 2 % (ref 0–3)
ERYTHROCYTE [DISTWIDTH] IN BLOOD BY AUTOMATED COUNT: 13.4 % (ref 11.5–14.5)
GFR SERPLBLD BASED ON 1.73 SQ M-ARVRAT: 51.4 ML/MIN
GLUCOSE SERPL-MCNC: 490 MG/DL (ref 70–99)
HCT VFR BLD CALC: 44.4 % (ref 39–53)
HGB BLD-MCNC: 14.9 G/DL (ref 13–17.5)
LYMPHOCYTES # BLD: 2.2 X10^3/UL (ref 1–4.8)
LYMPHOCYTES NFR BLD AUTO: 28 % (ref 24–48)
MCH RBC QN AUTO: 29 PG (ref 25–35)
MCHC RBC AUTO-ENTMCNC: 34 G/DL (ref 31–37)
MCV RBC AUTO: 87 FL (ref 79–100)
MONO #: 0.6 X10^3/UL (ref 0–1.1)
MONOCYTES NFR BLD: 7 % (ref 0–9)
NEUT #: 4.9 X10^3/UL (ref 1.8–7.7)
NEUTROPHILS NFR BLD AUTO: 62 % (ref 31–73)
PLATELET # BLD AUTO: 225 X10^3/UL (ref 140–400)
POTASSIUM SERPL-SCNC: 5.6 MMOL/L (ref 3.5–5.1)
RBC # BLD AUTO: 5.09 X10^6/UL (ref 4.3–5.7)
SODIUM SERPL-SCNC: 134 MMOL/L (ref 136–145)
WBC # BLD AUTO: 7.8 X10^3/UL (ref 4–11)

## 2021-05-28 PROCEDURE — 80048 BASIC METABOLIC PNL TOTAL CA: CPT

## 2021-05-28 PROCEDURE — 85025 COMPLETE CBC W/AUTO DIFF WBC: CPT

## 2021-05-28 PROCEDURE — 96360 HYDRATION IV INFUSION INIT: CPT

## 2021-05-28 PROCEDURE — 75635 CT ANGIO ABDOMINAL ARTERIES: CPT

## 2021-05-28 PROCEDURE — 99285 EMERGENCY DEPT VISIT HI MDM: CPT

## 2021-05-28 PROCEDURE — 83605 ASSAY OF LACTIC ACID: CPT

## 2021-05-28 PROCEDURE — 36415 COLL VENOUS BLD VENIPUNCTURE: CPT

## 2021-05-28 NOTE — RAD
STUDY: CT angiography of the aorta with runoff



INDICATION: Right calf pain, no palpable pulse, cool foot



COMPARISON: None



TECHNIQUE: Helical CT angiography of the abdominal aorta with runoff through the bilateral lower extr
emities. Multiplanar reconstructions were obtained to include 3D MIP reconstructions. Imaging was per
formed after the intravenous administration of intravenous contrast.



One or more of the following individualized dose reduction techniques were utilized for this examinat
ion:  



1. Automated exposure control

2. Adjustment of the mA and/or kV according to patient size

3. Use of iterative reconstruction technique.



FINDINGS:



VASCULATURE:



ABDOMINAL AORTA AND ILIAC ARTERIES: The abdominal aorta is normal in caliber. There is mild calcified
 and noncalcified aortic atherosclerosis. The celiac and superior mesenteric artery origins are widel
y patent. There are 2 right renal arteries. Mild narrowing of the more superior right renal artery or
igin there is focal moderate to severe narrowing of the proximal left renal artery (image 53 series 3
 and image 45 series 6). Mild narrowing of the inferior mesenteric artery origin.



Iliac arteries are normal in caliber. There is moderate calcified and noncalcified iliac artery ather
osclerosis, greater in the internal iliac arteries which demonstrate mild narrowing distally. There a
re bilateral persistent sciatic arteries. 



RIGHT LOWER EXTREMITY: The common femoral artery is normal in caliber and patent. There is a persiste
nt sciatic artery supplying the tibial peroneal trunk and a small but patent incomplete superficial f
emoral artery which terminates near Alexander's canal. Profunda femoris artery is patent. 



The persistent sciatic artery becomes occluded for about a 3-3.5 cm segment at the junction of the pr
oximal to middle third of the femoral shaft, and has at least mild diffuse narrowing after being cass
nstituted by a collateral vessel. The tibioperoneal trunk is patent. The proximal anterior tibial art
linda is not well opacified. It becomes slightly better opacified in the mid calf and then diminutive j
ust above the ankle. The peroneal artery becomes very diminutive just above the ankle. The posterior 
tibial artery is well opacified into the foot. Some or all of this may be due to contrast bolus timin
g.



Left lower extremity: There is a common femoral to tibioperoneal trunk bypass graft. The graft is pat
ent. The common femoral artery is patent. There is an incomplete, small superficial femoral artery wh
ich terminates near Alexander's canal. There is a persistent sciatic artery that is chronically occluded
 from the level of the proximal femoral shaft distally. The tibioperoneal trunk is supplied by the by
pass graft is patent. There is diminutive opacification of three-vessel runoff below the knee, likely
 due to contrast bolus timing.



ABDOMEN/PELVIS/EXTREMITIES:



Lung bases are clear. The heart is normal in size.



Liver is unremarkable. There is cholelithiasis. The pancreas, spleen, and adrenal glands are normal. 
Kidneys, ureters, and bladder are normal. Prostate gland is normal. No lymphadenopathy. The stomach, 
small bowel, and colon are unremarkable. No free fluid or free air. Small fat-containing left inguina
l hernia. There is no acute osseous abnormality. Mild degenerative joint disease of the hips and knee
s.



IMPRESSION:

1.  Bilateral anatomic variant of persistent sciatic arteries supplying the tibioperoneal trunks and 
incomplete superficial femoral arteries. On the right, there is a probable chronic occlusion of the p
roximal to mid persistent sciatic artery over a 3 to 3.5 cm segment. This becomes reconstituted via a
 collateral vessel and has at least mild diffuse narrowing beyond the occlusion.



2.  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
al trunk bypass graft.



3.  Bilateral 3 vessel runoff below the knee is fairly diminutive, greater on the left. This is favor
ed to be due to contrast bolus timing with diffuse narrowing due to peripheral vascular disease less 
likely.



4.  Moderate to severe focal narrowing of the proximal left renal artery.



5.  Cholelithiasis.





Results discussed by Dr. Dooley with the ER physician at approximately 9:00 PM on 5/28/2021.







Electronically signed by: Evangelina Dooley MD (5/28/2021 9:28 PM) [id number]

## 2021-05-28 NOTE — ED.ADGEN
Past Medical History


Past Medical History:  Diabetes-Type II, High Cholesterol, Hypertension, 

Vascular Disease, Other


Additional Past Medical Histor:  NEUROPATHY


Past Surgical History:  Coronary Bypass Surgery, Other


Additional Past Surgical Histo:  LEFT LOWER LEG "BYPASS"


Smoking Status:  Former Smoker


Alcohol Use:  None


Drug Use:  None





General Adult


EDM:


Chief Complaint:  LOWER EXT PAIN





HPI:


HPI:





Patient is a 60  year old  male, accompanied by his wife, who presents 

emergency department with complaints of ongoing pain in his right calf.  Patient

states that symptoms began about a year ago.  He has previously had a bypass in 

his left lower extremity due to blockage in his left leg.  He reports that his 

pain is similar to the pain that he experienced previously when he needed that 

bypass.  Patient denies any acute change in the symptoms.  He denies any loss of

sensation, color change, edema, or significant increase in the pain.  He denies 

any injury or trauma.  He currently rates his discomfort a 5 out of 10 on the 

pain scale, he reports that the pain improves with rest and is usually worse 

with activity.





Review of Systems:


Review of Systems:


Constitutional:   Denies fever or chills. []


HENT:   Denies nasal congestion or sore throat. [] 


Respiratory:   Denies cough or shortness of breath. [] 


Cardiovascular:   Denies chest pain or edema. [] 


GI:   Denies abdominal pain, nausea, vomiting, or diarrhea. [] 


Musculoskeletal:   See HPI


Integument:   Denies rash; see HPI. [] 


Neurologic:   Denies headache, focal weakness or sensory changes. [] 


Psychiatric:  Denies depression or anxiety. []





Current Medications:





Current Medications








 Medications


  (Trade)  Dose


 Ordered  Sig/Makenzie  Start Time


 Stop Time Status Last Admin


Dose Admin


 


 Info


  (CONTRAST GIVEN


 -- Rx MONITORING)  1 each  PRN DAILY  PRN  5/28/21 19:45


 5/28/21 22:36 DC  





 


 Iohexol


  (Omnipaque 350


 Mg/ml)  90 ml  1X  ONCE  5/28/21 20:00


 5/28/21 20:01 DC  





 


 Sodium Chloride  1,000 ml @ 


 1,000 mls/hr  1X  ONCE  5/28/21 20:00


 5/28/21 20:59 DC 5/28/21 20:34


1,000 MLS/HR











Allergies:


Allergies:





Allergies








Coded Allergies Type Severity Reaction Last Updated Verified


 


  No Known Drug Allergies    9/24/18 No











Physical Exam:


PE:





Constitutional: Well developed, well nourished, no acute distress, non-toxic 

appearance. []


HENT: Normocephalic, atraumatic, bilateral external ears normal, nose normal. []


Eyes: PERRLA, EOMI, conjunctiva normal, no discharge. [] 


Neck: Normal range of motion, no stridor. [] 


Cardiovascular:Heart rate regular rhythm


Lungs & Thorax:  Respirations even and unlabored, no retractions, no respiratory

 distress


Skin: Warm, dry, no erythema, no rash. [] 


Extremities: RLE: No cyanosis, cap refill less than 3 seconds, sensation intact,

 1+ palpable pedal pulse, ROM intact, no edema. [] 


Neurologic: Alert and oriented X 3, normal motor, normal sensory, no focal 

deficits noted. []


Psychologic: Affect normal, judgement normal, mood normal. []





Current Patient Data:


Labs:





                                Laboratory Tests








Test


 5/28/21


18:55


 


White Blood Count


 7.8 x10^3/uL


(4.0-11.0)


 


Red Blood Count


 5.09 x10^6/uL


(4.30-5.70)


 


Hemoglobin


 14.9 g/dL


(13.0-17.5)


 


Hematocrit


 44.4 %


(39.0-53.0)


 


Mean Corpuscular Volume


 87 fL ()





 


Mean Corpuscular Hemoglobin 29 pg (25-35)  


 


Mean Corpuscular Hemoglobin


Concent 34 g/dL


(31-37)


 


Red Cell Distribution Width


 13.4 %


(11.5-14.5)


 


Platelet Count


 225 x10^3/uL


(140-400)


 


Neutrophils (%) (Auto) 62 % (31-73)  


 


Lymphocytes (%) (Auto) 28 % (24-48)  


 


Monocytes (%) (Auto) 7 % (0-9)  


 


Eosinophils (%) (Auto) 2 % (0-3)  


 


Basophils (%) (Auto) 1 % (0-3)  


 


Neutrophils # (Auto)


 4.9 x10^3/uL


(1.8-7.7)


 


Lymphocytes # (Auto)


 2.2 x10^3/uL


(1.0-4.8)


 


Monocytes # (Auto)


 0.6 x10^3/uL


(0.0-1.1)


 


Eosinophils # (Auto)


 0.1 x10^3/uL


(0.0-0.7)


 


Basophils # (Auto)


 0.1 x10^3/uL


(0.0-0.2)


 


Sodium Level


 134 mmol/L


(136-145)  L


 


Potassium Level


 5.6 mmol/L


(3.5-5.1)  H


 


Chloride Level


 99 mmol/L


()


 


Carbon Dioxide Level


 24 mmol/L


(21-32)


 


Anion Gap 11 (6-14)  


 


Blood Urea Nitrogen


 35 mg/dL


(8-26)  H


 


Creatinine


 1.4 mg/dL


(0.7-1.3)  H


 


Estimated GFR


(Cockcroft-Gault) 51.4  





 


Glucose Level


 490 mg/dL


(70-99)  H


 


Lactic Acid Level


 1.2 mmol/L


(0.4-2.0)


 


Calcium Level


 8.8 mg/dL


(8.5-10.1)





                                Laboratory Tests


5/28/21 18:55








                                Laboratory Tests


5/28/21 18:55











Vital Signs:





                                   Vital Signs








  Date Time  Temp Pulse Resp B/P (MAP) Pulse Ox O2 Delivery O2 Flow Rate FiO2


 


5/28/21 22:11  74  146/85 (105) 98   


 


5/28/21 17:45 98.1  16   Room Air  





 98.1       











EKG:


EKG:


[]





Heart Score:


C/O Chest Pain:  No


Risk Scores:


Score 0 - 3:  2.5% MACE over next 6 weeks - Discharge Home


Score 4 - 6:  20.3% MACE over next 6 weeks - Admit for Clinical Observation


Score 7 - 10:  72.7% MACE over next 6 weeks - Early Invasive Strategies





Radiology/Procedures:


Radiology/Procedures:


PROCEDURE: CT ANGIO ABD ILEO/FEMOR RUNOFF





STUDY: CT angiography of the aorta with runoff





INDICATION: Right calf pain, no palpable pulse, cool foot





COMPARISON: None





TECHNIQUE: Helical CT angiography of the abdominal aorta with runoff through the

 bilateral lower extremities. Multiplanar reconstructions were obtained to 

include 3D MIP reconstructions. Imaging was performed after the intravenous 

administration of intravenous contrast.





One or more of the following individualized dose reduction techniques were 

utilized for this examination:  





1. Automated exposure control


2. Adjustment of the mA and/or kV according to patient size


3. Use of iterative reconstruction technique.





FINDINGS:





VASCULATURE:





ABDOMINAL AORTA AND ILIAC ARTERIES: The abdominal aorta is normal in caliber. 

There is mild calcified and noncalcified aortic atherosclerosis. The celiac and 

superior mesenteric artery origins are widely patent. There are 2 right renal 

arteries. Mild narrowing of the more superior right renal artery origin there is

 focal moderate to severe narrowing of the proximal left renal artery (image 53 

series 3 and image 45 series 6). Mild narrowing of the inferior mesenteric 

artery origin.





Iliac arteries are normal in caliber. There is moderate calcified and 

noncalcified iliac artery atherosclerosis, greater in the internal iliac 

arteries which demonstrate mild narrowing distally. There are bilateral 

persistent sciatic arteries. 





RIGHT LOWER EXTREMITY: The common femoral artery is normal in caliber and 

patent. There is a persistent sciatic artery supplying the tibial peroneal trunk

 and a small but patent incomplete superficial femoral artery which terminates 

near Alexander's canal. Profunda femoris artery is patent. 





The persistent sciatic artery becomes occluded for about a 3-3.5 cm segment at 

the junction of the proximal to middle third of the femoral shaft, and has at 

least mild diffuse narrowing after being reconstituted by a collateral vessel. 

The tibioperoneal trunk is patent. The proximal anterior tibial artery is not 

well opacified. It becomes slightly better opacified in the mid calf and then 

diminutive just above the ankle. The peroneal artery becomes very diminutive 

just above the ankle. The posterior tibial artery is well opacified into the 

foot. Some or all of this may be due to contrast bolus timing.





Left lower extremity: There is a common femoral to tibioperoneal trunk bypass 

graft. The graft is patent. The common femoral artery is patent. There is an 

incomplete, small superficial femoral artery which terminates near Alexander's 

canal. There is a persistent sciatic artery that is chronically occluded from 

the level of the proximal femoral shaft distally. The tibioperoneal trunk is 

supplied by the bypass graft is patent. There is diminutive opacification of 

three-vessel runoff below the knee, likely due to contrast bolus timing.





ABDOMEN/PELVIS/EXTREMITIES:





Lung bases are clear. The heart is normal in size.





Liver is unremarkable. There is cholelithiasis. The pancreas, spleen, and 

adrenal glands are normal. Kidneys, ureters, and bladder are normal. Prostate 

gland is normal. No lymphadenopathy. The stomach, small bowel, and colon are 

unremarkable. No free fluid or free air. Small fat-containing left inguinal 

hernia. There is no acute osseous abnormality. Mild degenerative joint disease 

of the hips and knees.





IMPRESSION:


1.  Bilateral anatomic variant of persistent sciatic arteries supplying the 

tibioperoneal trunks and incomplete superficial femoral arteries. On the right, 

there is a probable chronic occlusion of the proximal to mid persistent sciatic 

artery over a 3 to 3.5 cm segment. This becomes reconstituted via a collateral 

vessel and has at least mild diffuse narrowing beyond the occlusion.





2.  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 

femoral to tibioperoneal trunk bypass graft.





3.  Bilateral 3 vessel runoff below the knee is fairly diminutive, greater on 

the left. This is favored to be due to contrast bolus timing with diffuse 

narrowing due to peripheral vascular disease less likely.





4.  Moderate to severe focal narrowing of the proximal left renal artery.





5.  Cholelithiasis.








Results discussed by Dr. Dooley with the ER physician at approximately 9:00 PM

 on 5/28/2021.











Electronically signed by: Evangelina Dooley MD (5/28/2021 9:28 PM) [id number][]





Course & Med Decision Making:


Course & Med Decision Making


Pertinent Labs and Imaging studies reviewed. (See chart for details)


60-year-old male presents emergency department for evaluation of chronic pain in

 his right lower leg.


CT angio of the patient's bilateral lower extremities revealed:


   1.  Bilateral anatomic variant of persistent sciatic arteries supplying the 

tibioperoneal trunks and incomplete superficial femoral arteries. On the right, 

there is a probable chronic occlusion of the proximal to mid persistent sciatic 

artery over a 3 to 3.5 cm segment. This becomes reconstituted via a collateral 

            


   vessel and has at least mild diffuse narrowing beyond the occlusion.


   2.  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 

common femoral to tibioperoneal trunk bypass graft.


   3.  Bilateral 3 vessel runoff below the knee is fairly diminutive, greater on

 the left. This is favored to be due to contrast bolus timing with diffuse 

narrowing due to peripheral vascular disease less likely.


   4.  Moderate to severe focal narrowing of the proximal left renal artery.


   5.  Cholelithiasis.


I discussed the case with Dr. Arita.  Provided the patient with Dr. Espino's 

information for follow-up as this appears to be a chronic condition and not 

acute.  Prescription written for hydrocodone to take as needed for severe pain. 

 Patient instructed to return to the ER if symptoms worsened or fever develop.





Patient verbalized an understanding of home care, medications, follow-up, and 

return to ED instructions and was in agreement with the plan of care.





[]





Dragon Disclaimer:


Dragon Disclaimer:


This electronic medical record was generated, in whole or in part, using a voice

 recognition dictation system.





Departure


Departure


Impression:  


   Primary Impression:  


   Peripheral artery disease


Disposition:  01 HOME / SELF CARE / HOMELESS


Condition:  STABLE


Referrals:  


NO PCP (PCP)








JASPAL ESPINO MD


Patient Instructions:  Peripheral Vascular Disease, Easy-to-Read





Additional Instructions:  


Continue taking your medications as prescribed. Fill the prescription and take 

as needed for severe pain. Follow up with Dr. Espino or your vascular surgeon for 

further evaluation. Return to the ER if your symptoms worsen or a fever 

develops.


Scripts


Hydrocodone Bit/Acetaminophen (HYDROCODONE-APAP 5-325  **) 1 Tab Tablet


1 TAB PO PRN Q6HRS PRN for PAIN for 3 Days, #10 TAB 0 Refills


   Prov: ARACELI KHAN         5/28/21











ARACELI KHAN       May 28, 2021 21:46

## 2021-12-28 ENCOUNTER — HOSPITAL ENCOUNTER (EMERGENCY)
Dept: HOSPITAL 61 - ER | Age: 61
LOS: 1 days | Discharge: HOME | End: 2021-12-29
Payer: MEDICARE

## 2021-12-28 VITALS — BODY MASS INDEX: 28.69 KG/M2 | HEIGHT: 70 IN | WEIGHT: 200.4 LBS

## 2021-12-28 DIAGNOSIS — I10: ICD-10-CM

## 2021-12-28 DIAGNOSIS — E78.00: ICD-10-CM

## 2021-12-28 DIAGNOSIS — Z95.1: ICD-10-CM

## 2021-12-28 DIAGNOSIS — U07.1: Primary | ICD-10-CM

## 2021-12-28 DIAGNOSIS — E11.40: ICD-10-CM

## 2021-12-28 DIAGNOSIS — Z87.891: ICD-10-CM

## 2021-12-28 LAB
ALBUMIN SERPL-MCNC: 2.9 G/DL (ref 3.4–5)
ALBUMIN/GLOB SERPL: 0.7 {RATIO} (ref 1–1.7)
ALP SERPL-CCNC: 157 U/L (ref 46–116)
ALT SERPL-CCNC: 28 U/L (ref 16–63)
ANION GAP SERPL CALC-SCNC: 9 MMOL/L (ref 6–14)
AST SERPL-CCNC: 28 U/L (ref 15–37)
BASOPHILS # BLD AUTO: 0 X10^3/UL (ref 0–0.2)
BASOPHILS NFR BLD: 0 % (ref 0–3)
BILIRUB SERPL-MCNC: 0.5 MG/DL (ref 0.2–1)
BUN SERPL-MCNC: 16 MG/DL (ref 8–26)
BUN/CREAT SERPL: 13 (ref 6–20)
CALCIUM SERPL-MCNC: 7.9 MG/DL (ref 8.5–10.1)
CHLORIDE SERPL-SCNC: 100 MMOL/L (ref 98–107)
CO2 SERPL-SCNC: 27 MMOL/L (ref 21–32)
CREAT SERPL-MCNC: 1.2 MG/DL (ref 0.7–1.3)
EOSINOPHIL NFR BLD: 0 % (ref 0–3)
EOSINOPHIL NFR BLD: 0 X10^3/UL (ref 0–0.7)
ERYTHROCYTE [DISTWIDTH] IN BLOOD BY AUTOMATED COUNT: 14.7 % (ref 11.5–14.5)
GFR SERPLBLD BASED ON 1.73 SQ M-ARVRAT: 61.1 ML/MIN
GLUCOSE SERPL-MCNC: 213 MG/DL (ref 70–99)
HCT VFR BLD CALC: 39.7 % (ref 39–53)
HGB BLD-MCNC: 13.2 G/DL (ref 13–17.5)
INFLUENZA A PATIENT: NEGATIVE
INFLUENZA B PATIENT: NEGATIVE
LYMPHOCYTES # BLD: 0.6 X10^3/UL (ref 1–4.8)
LYMPHOCYTES NFR BLD AUTO: 7 % (ref 24–48)
MCH RBC QN AUTO: 29 PG (ref 25–35)
MCHC RBC AUTO-ENTMCNC: 33 G/DL (ref 31–37)
MCV RBC AUTO: 85 FL (ref 79–100)
MONO #: 0.6 X10^3/UL (ref 0–1.1)
MONOCYTES NFR BLD: 6 % (ref 0–9)
NEUT #: 7.6 X10^3/UL (ref 1.8–7.7)
NEUTROPHILS NFR BLD AUTO: 86 % (ref 31–73)
PLATELET # BLD AUTO: 201 X10^3/UL (ref 140–400)
POTASSIUM SERPL-SCNC: 4.2 MMOL/L (ref 3.5–5.1)
PROT SERPL-MCNC: 7.3 G/DL (ref 6.4–8.2)
RBC # BLD AUTO: 4.65 X10^6/UL (ref 4.3–5.7)
SODIUM SERPL-SCNC: 136 MMOL/L (ref 136–145)
WBC # BLD AUTO: 8.8 X10^3/UL (ref 4–11)

## 2021-12-28 PROCEDURE — 36415 COLL VENOUS BLD VENIPUNCTURE: CPT

## 2021-12-28 PROCEDURE — 85025 COMPLETE CBC W/AUTO DIFF WBC: CPT

## 2021-12-28 PROCEDURE — 99284 EMERGENCY DEPT VISIT MOD MDM: CPT

## 2021-12-28 PROCEDURE — 80053 COMPREHEN METABOLIC PANEL: CPT

## 2021-12-28 PROCEDURE — 96374 THER/PROPH/DIAG INJ IV PUSH: CPT

## 2021-12-28 PROCEDURE — 87804 INFLUENZA ASSAY W/OPTIC: CPT

## 2021-12-28 PROCEDURE — 96361 HYDRATE IV INFUSION ADD-ON: CPT

## 2021-12-28 PROCEDURE — 87426 SARSCOV CORONAVIRUS AG IA: CPT

## 2021-12-28 PROCEDURE — 71045 X-RAY EXAM CHEST 1 VIEW: CPT

## 2021-12-28 NOTE — RAD
XR CHEST 1V



Clinical Indication: Reason: cough and body aches 



Comparison:  None.



Findings: 

The cardiomediastinal silhouette is normal. There are minimal patchy airspace opacities in the right 
midlung and the left lung base. There is no pneumothorax. No pleural effusion is appreciated. No acut
e bone abnormality.



IMPRESSION: 

There are minimal patchy airspace opacities in the right midlung and the left lung base that may be i
nfectious/inflammatory.



Electronically signed by: Scott Bueno MD (12/28/2021 10:34 PM) [facility]

## 2021-12-28 NOTE — PHYS DOC
Past Medical History


Past Medical History:  Diabetes-Type II, High Cholesterol, Hypertension, V

ascular Disease, Other


Additional Past Medical Histor:  NEUROPATHY


Past Surgical History:  Coronary Bypass Surgery, Other


Additional Past Surgical Histo:  Open Heart at [facility]


Smoking Status:  Former Smoker


Alcohol Use:  None


Drug Use:  None





General Adult


EDM:


Chief Complaint:  FLU SYMPTOM





HPI:


HPI:





Patient is a 61-year-old male who presents today with cough, body aches and 

overall fatigue for the last couple of days.  Patient states over the last 

couple days has had increased cough and body aches today he started running 

chills and fever.  He denies chest pain or shortness of air at this time.  

Patient states he does have the Moderna Covid vaccines x2 in March 2021, he 

states he received the influenza vaccine 2 weeks ago.  He does not recall any 

direct contact with the Covid positive person at this time.  His wife is at the 

bedside.





Review of Systems:


Review of Systems:


Constitutional:   chills. []


Eyes:   Denies change in visual acuity. []


HENT:   Denies nasal congestion or sore throat. [] 


Respiratory:  cough denies shortness of breath. [] 


Cardiovascular:   Denies chest pain or edema. [] 


GI:   Denies abdominal pain, nausea, vomiting, bloody stools or diarrhea. [] 


:  Denies dysuria. [] 


Musculoskeletal:   body aches


Integument:   Denies rash. [] 


Neurologic:   Denies headache, focal weakness or sensory changes. [] 


Endocrine:   Denies polyuria or polydipsia. [] 


Lymphatic:  Denies swollen glands. [] 


Psychiatric:  Denies depression or anxiety. []





Heart Score:


C/O Chest Pain:  N/A


Risk Factors:


Risk Factors:  DM, Current or recent (<one month) smoker, HTN, HLP, family 

history of CAD, obesity.


Risk Scores:


Score 0 - 3:  2.5% MACE over next 6 weeks - Discharge Home


Score 4 - 6:  20.3% MACE over next 6 weeks - Admit for Clinical Observation


Score 7 - 10:  72.7% MACE over next 6 weeks - Early Invasive Strategies





Current Medications:





Current Medications








 Medications


  (Trade)  Dose


 Ordered  Sig/Makenzie  Start Time


 Stop Time Status Last Admin


Dose Admin


 


 Acetaminophen


  (Tylenol)  1,000 mg  1X  ONCE  12/28/21 22:00


 12/28/21 22:01 UNV  





 


 Sodium Chloride  1,000 ml @ 


 999 mls/hr  1X  ONCE  12/28/21 22:00


 12/28/21 23:00 UNV  














Allergies:


Allergies:





Allergies








Coded Allergies Type Severity Reaction Last Updated Verified


 


  No Known Drug Allergies    9/24/18 No











Physical Exam:


PE:





Constitutional: Well developed, well nourished, no acute distress, non-toxic 

appearance. []


HENT: Normocephalic, atraumatic, bilateral external ears normal, oropharynx 

moist, no oral exudates, nose normal. []


Eyes: PERRLA, EOMI, conjunctiva normal, no discharge. [] 


Neck: Normal range of motion, no tenderness, supple, no stridor. [] 


Cardiovascular:Heart rate regular rhythm, no murmur []


Lungs & Thorax:  Bilateral breath sounds clear to auscultation []


Abdomen: Bowel sounds normal, soft, no tenderness, no masses, no pulsatile 

masses. [] 


Skin: Warm, dry, no erythema, no rash. [] 


Back: No tenderness, no CVA tenderness. [] 


Extremities: No tenderness, no cyanosis, no clubbing, ROM intact, no edema. [] 


Neurologic: Alert and oriented X 3, normal motor function, normal sensory 

function, no focal deficits noted. []


Psychologic: Affect normal, judgement normal, mood normal. []





Current Patient Data:


Labs:





Laboratory Tests








Test


 12/28/21


22:10 12/28/21


22:20


 


Influenza Type A Antigen Negative  


 


Influenza Type B Antigen Negative  


 


SARS-CoV-2 Antigen (Rapid) Positive  


 


White Blood Count  8.8 x10^3/uL 


 


Red Blood Count  4.65 x10^6/uL 


 


Hemoglobin  13.2 g/dL 


 


Hematocrit  39.7 % 


 


Mean Corpuscular Volume  85 fL 


 


Mean Corpuscular Hemoglobin  29 pg 


 


Mean Corpuscular Hemoglobin


Concent 


 33 g/dL 





 


Red Cell Distribution Width  14.7 % 


 


Platelet Count  201 x10^3/uL 


 


Neutrophils (%) (Auto)  86 % 


 


Lymphocytes (%) (Auto)  7 % 


 


Monocytes (%) (Auto)  6 % 


 


Eosinophils (%) (Auto)  0 % 


 


Basophils (%) (Auto)  0 % 


 


Neutrophils # (Auto)  7.6 x10^3/uL 


 


Lymphocytes # (Auto)  0.6 x10^3/uL 


 


Monocytes # (Auto)  0.6 x10^3/uL 


 


Eosinophils # (Auto)  0.0 x10^3/uL 


 


Basophils # (Auto)  0.0 x10^3/uL 


 


Sodium Level  136 mmol/L 


 


Potassium Level  4.2 mmol/L 


 


Chloride Level  100 mmol/L 


 


Carbon Dioxide Level  27 mmol/L 


 


Anion Gap  9 


 


Blood Urea Nitrogen  16 mg/dL 


 


Creatinine  1.2 mg/dL 


 


Estimated GFR


(Cockcroft-Gault) 


 61.1 





 


BUN/Creatinine Ratio  13 


 


Glucose Level  213 mg/dL 


 


Calcium Level  7.9 mg/dL 


 


Total Bilirubin  0.5 mg/dL 


 


Aspartate Amino Transf


(AST/SGOT) 


 28 U/L 





 


Alanine Aminotransferase


(ALT/SGPT) 


 28 U/L 





 


Alkaline Phosphatase  157 U/L 


 


Total Protein  7.3 g/dL 


 


Albumin  2.9 g/dL 


 


Albumin/Globulin Ratio  0.7 








Current Medications








 Medications


  (Trade)  Dose


 Ordered  Sig/Makenzie


 Route


 PRN Reason  Start Time


 Stop Time Status Last Admin


Dose Admin


 


 Sodium Chloride  1,000 ml @ 


 999 mls/hr  1X  ONCE


 IV


   12/28/21 22:00


 12/28/21 23:00 DC 12/28/21 22:00





 


 Acetaminophen


  (Tylenol)  1,000 mg  1X  ONCE


 PO


   12/28/21 22:00


 12/28/21 22:06 DC 12/28/21 22:00





 


 Sodium Chloride  1,000 ml @ 


 999 mls/hr  1X  ONCE


 IV


   12/28/21 23:45


 12/29/21 00:45  12/28/21 23:45





 


 Dexamethasone


 Sodium Phosphate


  (Decadron)  10 mg  1X  ONCE


 IV


   12/29/21 00:00


 12/29/21 00:05 DC 12/29/21 00:00











Vital Signs:





Vital Signs








  Date Time  Temp Pulse Resp B/P (MAP) Pulse Ox O2 Delivery O2 Flow Rate FiO2


 


12/29/21 00:00 100.0       





 100.0       


 


12/28/21 21:15 102.7 120 20 158/75 (102) 95 Room Air  





 102.7       








                                   Vital Signs








  Date Time  Temp Pulse Resp B/P (MAP) Pulse Ox O2 Delivery O2 Flow Rate FiO2


 


12/28/21 21:15 102.7 120 20 158/75 (102) 95 Room Air  





 102.7       











EKG:


EKG:


[]





Radiology/Procedures:


Radiology/Procedures:


REASON: cough and body aches


PROCEDURE: CHEST AP ONLY





XR CHEST 1V





Clinical Indication: Reason: cough and body aches 





Comparison:  None.





Findings: 


The cardiomediastinal silhouette is normal. There are minimal patchy airspace 

opacities in the right midlung and the left lung base. There is no pneumothorax.

 No pleural effusion is appreciated. No acute bone abnormality.





IMPRESSION: 


There are minimal patchy airspace opacities in the right midlung and the left 

lung base that may be infectious/inflammatory.





Electronically signed by: Scott Bueno MD (12/28/2021 10:34 PM) [facility]





[]





Course & Med Decision Making:


Course & Med Decision Making


Pertinent Labs and Imaging studies reviewed. (See chart for details)





2356 reassessment of patient , Temp 100.0F orally, oxygen saturations 93 

to 94% on room air patient states he feels better.  Will administer a second 

liter of normal saline, patient denies chest pain or shortness of air at this 

time. 





0032 reassessment patient's heart rate is 96-99, sats are 91 when he is falling 

asleep and 94% when he is awake.  Respiratory rate is 16-18.  No increased work 

of breathing noted cap refill is less than 2 seconds.  Patient states he feels 

much better than he did when he came in.  Patient informed that he will need to 

treat his fevers at home with Tylenol over-the-counter, patient also instructed 

to stay hydrated during this time.  Patient verbalized understanding of this, 

patient will also be given a prescription for Zithromax to be taken at home.  

Patient is instructed to return to the emergency department for increased work 

of breathing, shortness of air, or any other concerns you may have.





Dragon Disclaimer:


Dragon Disclaimer:


This electronic medical record was generated, in whole or in part, using a voice

 recognition dictation system.





Departure


Departure


Impression:  


   Primary Impression:  


   COVID-19


Disposition:  01 HOME / SELF CARE / HOMELESS


Condition:  STABLE


Referrals:  


NO PCP (PCP)





Additional Instructions:  


Zithromax as directed 


cool mist humidifer in bedroom while sleeping


increase by mouth fluid


Tyelnol as label directed for fever. Last dose of Tyelnol here in ED was at 

10pm.





You have been tested for or diagnosed with COVID-19. It is an infection caused 

by a new type 


of coronavirus. COVID-19 will cause cold-like or mild flu symptoms in most. It 

can cause 


more severe symptoms like problems breathing in some.





There is no treatment for COVID-19. The body will clear the infection over time.

 Self-care 


will help to ease discomfort.





Steps to Take:


Self-Care


Rest as needed. Healthy habits may help you feel better. Steps include:





Choose healthy foods including fruits and vegetables. Drink water throughout the

 day.


Get plenty of sleep each night.


If you smoke, try to quit. It may ease breathing.


Avoid alcohol.


Keep Others Healthy


The virus can spread to others. Droplets are released every time you sneeze or 

cough. The 


droplets can get into the mouth, nose, or eyes of people near you and lead to 

infection. To 


lower the chances of spreading COVID-19 to others:





Stay at home until your doctor has said it is safe to leave. If you tested 

positive this 


will mean staying isolated until both of the following are true:





At least 14 days have passed since the start of illness.


You are free of fever for at least 72 hours without the use of medicine.


During this time:





 - Avoid public areas, events, or transportation. Do not return to work or 

school until your 


doctor has said it is safe to do so.


 - Call ahead if you need to go to a medical center. Let them know you may have 

COVID-19. It 


will help them guide you where to go. They may also ask you to wear a facemask 

when you come 


to the office.


 - If you call for emergency medical services, let them know you may have COVID-

19.


While at home:





 - Try to avoid close contact with others. Stay about 6 feet away.


 - If possible, spend most of your time in a separate room from others.


 - Use a face mask if you will be in close contact with others such as sharing a

 room or 


vehicle.


 - Have someone wipe down common surfaces in the home. Use household  

every day on 


areas like doorknobs, counters, or sinks.


 - Cough or sneeze into a tissue. Throw the tissue away right after use. If a 

tissue is not 


available, cough or sneeze into your elbow.


 - Wash your hands often. Wash them after sneezing or coughing. Use soap and 

water and wash 


for at least 20 seconds. Alcohol based hand  can be used if soap and trudi

er is not 


available.


 - Do not prepare food for others. Avoid sharing personal items like forks, 

spoons, or 


toothbrushes.


 - Avoid close contact with pets while you are sick. There is no evidence of the

 virus 


passing to pets. This is a safety step until more is known about this virus.


Isolation can be frustrating. Social interaction can help. Keep in touch with 

friends and 


family through phone and tech options. You can still interact with others in 

your home, just 


keep a safe distance of about 6 feet.





Follow-up:


Your doctors office will check in with you to see if there are any changes in 

your health. 


You may be asked to keep track of symptoms to share with them. They will also 

let you know 


when you are clear to be in public again.





Problems to Look Out For:


Contact your doctor if your recovery is not going as you expect. Get emergency 

care if you 


have problems such as:





 - Trouble breathing


 - Nonstop chest pain or pressure


 - Changes in awareness, confusion, or problems waking


 - Lips or face have bluish color


 - Worsening of symptoms


If you think you have an emergency, call for emergency medical services right 

away.





As taken from [facility]


Scripts


Azithromycin (AZITHROMYCIN TABLET) 250 Mg Tablet


1 PKG PO UD for 5 Days, #6 TAB 0 Refills


   2 the first day followed by 1 for days 2-5


   Prov: CARLA BURLESON         12/29/21











CARLA BURLESON       Dec 28, 2021 22:07

## 2021-12-29 VITALS — SYSTOLIC BLOOD PRESSURE: 108 MMHG | DIASTOLIC BLOOD PRESSURE: 54 MMHG

## 2021-12-31 ENCOUNTER — HOSPITAL ENCOUNTER (INPATIENT)
Dept: HOSPITAL 61 - ER | Age: 61
LOS: 10 days | Discharge: HOME | DRG: 177 | End: 2022-01-10
Attending: INTERNAL MEDICINE | Admitting: INTERNAL MEDICINE
Payer: MEDICARE

## 2021-12-31 VITALS — HEIGHT: 72 IN | WEIGHT: 194.01 LBS | BODY MASS INDEX: 26.28 KG/M2

## 2021-12-31 VITALS — SYSTOLIC BLOOD PRESSURE: 169 MMHG | DIASTOLIC BLOOD PRESSURE: 80 MMHG

## 2021-12-31 DIAGNOSIS — U07.1: Primary | ICD-10-CM

## 2021-12-31 DIAGNOSIS — Z87.891: ICD-10-CM

## 2021-12-31 DIAGNOSIS — I10: ICD-10-CM

## 2021-12-31 DIAGNOSIS — K21.9: ICD-10-CM

## 2021-12-31 DIAGNOSIS — J44.1: ICD-10-CM

## 2021-12-31 DIAGNOSIS — E11.65: ICD-10-CM

## 2021-12-31 DIAGNOSIS — I25.10: ICD-10-CM

## 2021-12-31 DIAGNOSIS — Z82.49: ICD-10-CM

## 2021-12-31 DIAGNOSIS — Z95.1: ICD-10-CM

## 2021-12-31 DIAGNOSIS — G62.9: ICD-10-CM

## 2021-12-31 DIAGNOSIS — J96.01: ICD-10-CM

## 2021-12-31 DIAGNOSIS — J44.0: ICD-10-CM

## 2021-12-31 DIAGNOSIS — J12.82: ICD-10-CM

## 2021-12-31 DIAGNOSIS — Z99.81: ICD-10-CM

## 2021-12-31 DIAGNOSIS — E78.00: ICD-10-CM

## 2021-12-31 LAB
ALBUMIN SERPL-MCNC: 2.4 G/DL (ref 3.4–5)
ALBUMIN/GLOB SERPL: 0.5 {RATIO} (ref 1–1.7)
ALP SERPL-CCNC: 213 U/L (ref 46–116)
ALT SERPL-CCNC: 24 U/L (ref 16–63)
ANION GAP SERPL CALC-SCNC: 11 MMOL/L (ref 6–14)
AST SERPL-CCNC: 60 U/L (ref 15–37)
BASOPHILS # BLD AUTO: 0 X10^3/UL (ref 0–0.2)
BASOPHILS NFR BLD: 0 % (ref 0–3)
BILIRUB SERPL-MCNC: 0.5 MG/DL (ref 0.2–1)
BUN SERPL-MCNC: 14 MG/DL (ref 8–26)
BUN/CREAT SERPL: 10 (ref 6–20)
CALCIUM SERPL-MCNC: 7.6 MG/DL (ref 8.5–10.1)
CHLORIDE SERPL-SCNC: 96 MMOL/L (ref 98–107)
CO2 SERPL-SCNC: 25 MMOL/L (ref 21–32)
CREAT SERPL-MCNC: 1.4 MG/DL (ref 0.7–1.3)
EOSINOPHIL NFR BLD: 0 % (ref 0–3)
EOSINOPHIL NFR BLD: 0 X10^3/UL (ref 0–0.7)
ERYTHROCYTE [DISTWIDTH] IN BLOOD BY AUTOMATED COUNT: 14.6 % (ref 11.5–14.5)
GFR SERPLBLD BASED ON 1.73 SQ M-ARVRAT: 51.2 ML/MIN
GLUCOSE SERPL-MCNC: 355 MG/DL (ref 70–99)
HCT VFR BLD CALC: 42.2 % (ref 39–53)
HGB BLD-MCNC: 14.2 G/DL (ref 13–17.5)
LYMPHOCYTES # BLD: 0.6 X10^3/UL (ref 1–4.8)
LYMPHOCYTES NFR BLD AUTO: 6 % (ref 24–48)
MCH RBC QN AUTO: 28 PG (ref 25–35)
MCHC RBC AUTO-ENTMCNC: 34 G/DL (ref 31–37)
MCV RBC AUTO: 84 FL (ref 79–100)
MONO #: 0.2 X10^3/UL (ref 0–1.1)
MONOCYTES NFR BLD: 2 % (ref 0–9)
NEUT #: 9.2 X10^3/UL (ref 1.8–7.7)
NEUTROPHILS NFR BLD AUTO: 92 % (ref 31–73)
PLATELET # BLD AUTO: 187 X10^3/UL (ref 140–400)
POTASSIUM SERPL-SCNC: 4.3 MMOL/L (ref 3.5–5.1)
PROT SERPL-MCNC: 7.1 G/DL (ref 6.4–8.2)
RBC # BLD AUTO: 5.02 X10^6/UL (ref 4.3–5.7)
SODIUM SERPL-SCNC: 132 MMOL/L (ref 136–145)
WBC # BLD AUTO: 10 X10^3/UL (ref 4–11)

## 2021-12-31 PROCEDURE — 96365 THER/PROPH/DIAG IV INF INIT: CPT

## 2021-12-31 PROCEDURE — 96375 TX/PRO/DX INJ NEW DRUG ADDON: CPT

## 2021-12-31 PROCEDURE — 83605 ASSAY OF LACTIC ACID: CPT

## 2021-12-31 PROCEDURE — 93005 ELECTROCARDIOGRAM TRACING: CPT

## 2021-12-31 PROCEDURE — 87426 SARSCOV CORONAVIRUS AG IA: CPT

## 2021-12-31 PROCEDURE — 36415 COLL VENOUS BLD VENIPUNCTURE: CPT

## 2021-12-31 PROCEDURE — 82962 GLUCOSE BLOOD TEST: CPT

## 2021-12-31 PROCEDURE — 71275 CT ANGIOGRAPHY CHEST: CPT

## 2021-12-31 PROCEDURE — 80048 BASIC METABOLIC PNL TOTAL CA: CPT

## 2021-12-31 PROCEDURE — 96374 THER/PROPH/DIAG INJ IV PUSH: CPT

## 2021-12-31 PROCEDURE — 94618 PULMONARY STRESS TESTING: CPT

## 2021-12-31 PROCEDURE — 80053 COMPREHEN METABOLIC PANEL: CPT

## 2021-12-31 PROCEDURE — 83880 ASSAY OF NATRIURETIC PEPTIDE: CPT

## 2021-12-31 PROCEDURE — 71045 X-RAY EXAM CHEST 1 VIEW: CPT

## 2021-12-31 PROCEDURE — 85027 COMPLETE CBC AUTOMATED: CPT

## 2021-12-31 PROCEDURE — 84484 ASSAY OF TROPONIN QUANT: CPT

## 2021-12-31 PROCEDURE — 87804 INFLUENZA ASSAY W/OPTIC: CPT

## 2021-12-31 PROCEDURE — 87040 BLOOD CULTURE FOR BACTERIA: CPT

## 2021-12-31 PROCEDURE — 96361 HYDRATE IV INFUSION ADD-ON: CPT

## 2021-12-31 PROCEDURE — 85007 BL SMEAR W/DIFF WBC COUNT: CPT

## 2021-12-31 PROCEDURE — G0378 HOSPITAL OBSERVATION PER HR: HCPCS

## 2021-12-31 PROCEDURE — 85025 COMPLETE CBC W/AUTO DIFF WBC: CPT

## 2021-12-31 NOTE — RAD
EXAMINATION: Chest radiograph.



VIEWS: Single AP view of the chest



COMPARISON: Chest radiograph from 12/20/2021



INDICATION:61 years, Male, shortness of air.



FINDINGS: 

Stable cardiomediastinal silhouette. Median sternotomy wires are intact. Worsening patchy and conflue
nt bilateral airspace opacities. No pleural effusion or pneumothorax. No acute osseous process.



IMPRESSION:

Worsening bilateral multifocal pneumonia.



Electronically signed by: Shaw Layne DO (12/31/2021 7:22 PM) [facility]

## 2021-12-31 NOTE — PHYS DOC
Past Medical History


Past Medical History:  Diabetes-Type II, High Cholesterol, Hypertension, V

ascular Disease, Other


Additional Past Medical Histor:  NEUROPATHY


 (CARLA BURLESON)


Past Surgical History:  Coronary Bypass Surgery, Other


Additional Past Surgical Histo:  Open Heart at [facility]


 (CARLA BURLESON APRN)


Smoking Status:  Former Smoker


Alcohol Use:  None


Drug Use:  None


 (CARLA BURLESON)





General Adult


HPI:


HPI:





Patient is a 61-year-old male that presents today with [facility] 

with shortness of air. Patient was seen at [facility] on December 28, 2021 and diagnosed with COVID-19, patient states that since his diagnosis he

has had diarrhea and increased shortness of breath. Patient states he continues 

to have body aches and pains and running a fever. According to EMS patient's 

room air sats at the scene was 74% he was then placed on 6 L nasal cannula and 

transported to the hospital. Patient has had both his Pfizer vaccines and his 

booster. Patient is also had his influenza vaccine.


 (CARLA BURLESON APRN)





Review of Systems:


Review of Systems:


Constitutional:   fever  []


Eyes:   Denies change in visual acuity. []


HENT:   Denies nasal congestion or sore throat. [] 


Respiratory:   shortness of breath. [] 


Cardiovascular:   Denies chest pain or edema. [] 


GI:   diarrhea. [] 


:  Denies dysuria. [] 


Musculoskeletal:   body aches [] 


Integument:   Denies rash. [] 


Neurologic:   Denies headache, focal weakness or sensory changes. [] 


Endocrine:   Denies polyuria or polydipsia. [] 


Lymphatic:  Denies swollen glands. [] 


Psychiatric:  Denies depression or anxiety. []


 (CARLA BURLESON APRN)





Heart Score:


C/O Chest Pain:  N/A


Risk Factors:


Risk Factors:  DM, Current or recent (<one month) smoker, HTN, HLP, family 

history of CAD, obesity.


Risk Scores:


Score 0 - 3:  2.5% MACE over next 6 weeks - Discharge Home


Score 4 - 6:  20.3% MACE over next 6 weeks - Admit for Clinical Observation


Score 7 - 10:  72.7% MACE over next 6 weeks - Early Invasive Strategies


 (DERCARLA CELIS)





Allergies:


Allergies:





Allergies








Coded Allergies Type Severity Reaction Last Updated Verified


 


  No Known Drug Allergies    9/24/18 No








 (CARLA BURLESON)





Physical Exam:


PE:





Constitutional: moderate distress, toxic appearance


HENT: Normocephalic, atraumatic, bilateral external ears normal, oropharynx 

moist, no oral exudates, nose normal. []


Eyes: PERRLA, EOMI, conjunctiva normal, no discharge. [] 


Neck: Normal range of motion, no tenderness, supple, no stridor. [] 


Cardiovascular:Heart rate regular rhythm, no murmur []


Lungs & Thorax:  Bilateral breath sounds diminshed, cough noted []


Abdomen: Bowel sounds normal, soft, no tenderness, no masses, no pulsatile 

masses. [] 


Skin: Warm, dry, pale no erythema, no rash. [] 


Back: No tenderness, no CVA tenderness. [] 


Extremities: No tenderness, no cyanosis, no clubbing, ROM intact, no edema. [] 


Neurologic: Alert and oriented X 3, normal motor function, normal sensory 

function, no focal deficits noted. []


Psychologic: Affect normal, judgement normal, mood normal. []


 (CARLA BURLESON)





Current Patient Data:


Labs:





Laboratory Tests








Test


 12/31/21


18:13


 


White Blood Count 10.0 x10^3/uL 


 


Red Blood Count 5.02 x10^6/uL 


 


Hemoglobin 14.2 g/dL 


 


Hematocrit 42.2 % 


 


Mean Corpuscular Volume 84 fL 


 


Mean Corpuscular Hemoglobin 28 pg 


 


Mean Corpuscular Hemoglobin


Concent 34 g/dL 





 


Red Cell Distribution Width 14.6 % 


 


Platelet Count 187 x10^3/uL 


 


Neutrophils (%) (Auto) 92 % 


 


Lymphocytes (%) (Auto) 6 % 


 


Monocytes (%) (Auto) 2 % 


 


Eosinophils (%) (Auto) 0 % 


 


Basophils (%) (Auto) 0 % 


 


Neutrophils # (Auto) 9.2 x10^3/uL 


 


Lymphocytes # (Auto) 0.6 x10^3/uL 


 


Monocytes # (Auto) 0.2 x10^3/uL 


 


Eosinophils # (Auto) 0.0 x10^3/uL 


 


Basophils # (Auto) 0.0 x10^3/uL 


 


Sodium Level 132 mmol/L 


 


Potassium Level 4.3 mmol/L 


 


Chloride Level 96 mmol/L 


 


Carbon Dioxide Level 25 mmol/L 


 


Anion Gap 11 


 


Blood Urea Nitrogen 14 mg/dL 


 


Creatinine 1.4 mg/dL 


 


Estimated GFR


(Cockcroft-Gault) 51.2 





 


BUN/Creatinine Ratio 10 


 


Glucose Level 355 mg/dL 


 


Lactic Acid Level 2.8 mmol/L 


 


Calcium Level 7.6 mg/dL 


 


Total Bilirubin 0.5 mg/dL 


 


Aspartate Amino Transf


(AST/SGOT) 60 U/L 





 


Alanine Aminotransferase


(ALT/SGPT) 24 U/L 





 


Alkaline Phosphatase 213 U/L 


 


Troponin I High Sensitivity 169 ng/L 


 


NT-Pro-B-Type Natriuretic


Peptide 5627 pg/mL 





 


Total Protein 7.1 g/dL 


 


Albumin 2.4 g/dL 


 


Albumin/Globulin Ratio 0.5 








Current Medications








 Medications


  (Trade)  Dose


 Ordered  Sig/Makenzie


 Route


 PRN Reason  Start Time


 Stop Time Status Last Admin


Dose Admin


 


 Dexamethasone


 Sodium Phosphate


  (Decadron)  10 mg  1X  ONCE


 IV


   12/31/21 19:30


 12/31/21 19:31 DC 12/31/21 20:54





 


 Sodium Chloride  1,000 ml @ 


 1,000 mls/hr  1X  ONCE


 IV


   12/31/21 19:30


 12/31/21 20:29 DC 12/31/21 20:54





 


 Iohexol


  (Omnipaque 350


 Mg/ml)  80 ml  1X  ONCE


 IV


   12/31/21 20:00


 12/31/21 20:01 DC 12/31/21 20:26





 


 Info


  (CONTRAST GIVEN


 -- Rx MONITORING)  1 each  PRN DAILY  PRN


 [facility]


 SEE COMMENTS  12/31/21 20:00


 1/2/22 19:59   











Vital Signs:





Vital Signs








  Date Time  Temp Pulse Resp B/P (MAP) Pulse Ox O2 Delivery O2 Flow Rate FiO2


 


12/31/21 18:05 99.0 104 28 207/92 (130) 93 Nasal Cannula 5.0 





 99.0       








Vital Signs








  Date Time  Temp Pulse Resp B/P (MAP) Pulse Ox O2 Delivery O2 Flow Rate FiO2


 


12/31/21 18:05 99.0 104 28 207/92 (130) 93 Nasal Cannula 5.0 





 99.0       








 (CARLA BURLESON)





EKG:


EKG:


EKG done at 1827 read by Dr. Rivera at 1830 no STEMI sinus rhythm heart rate of 

100 MO interval of 126 ms with a QT interval of 436 ms []


 (CARLA BURLESON)





Radiology/Procedures:


Radiology/Procedures:


REASON: SOA


PROCEDURE: CHEST AP ONLY





EXAMINATION: Chest radiograph.





VIEWS: Single AP view of the chest





COMPARISON: Chest radiograph from 12/20/2021





INDICATION:61 years, Male, shortness of air.





FINDINGS: 


Stable cardiomediastinal silhouette. Median sternotomy wires are intact. 

Worsening patchy and confluent bilateral airspace opacities. No pleural effusion

 or pneumothorax. No acute osseous process.





IMPRESSION:


Worsening bilateral multifocal pneumonia.





Electronically signed by: Shaw Layne DO (12/31/2021 7:22 PM) [facility]


REASON: Shortness of breath


PROCEDURE: CT ANGIOGRAPHY CHEST





EXAMINATION: CT Pulmonary Angiogram with IV contrast





INDICATION: Reason: Shortness of breath / Spl. Instructions: IV omni 350 80 mls 

/ History: 





COMPARISON: Same day chest radiograph





TECHNIQUE: Using helical technique, CT data from the thoracic inlet through the 

upper abdomen was obtained during rapid IV contrast infusion. The examination 

was timed to the pulmonary arterial system to generate a CT angiographic study. 

3D MIPS, sagittal and coronal reformats were generated.


 


FINDINGS:


Vascular:     


The study is diagnostic to the level of the subsegmental pulmonary arteries. 

Adequate opacification of pulmonary arteries.


Pulmonary arteries: No evidence of acute or chronic pulmonary embolism. Main 

pulmonary trunk is dilated up to 3.3 cm can be seen in setting of pulmonary 

hypertension.


Thoracic aorta: Normal in size.


Coronary arteries: Normal origins. Mild calcified coronary atherosclerosis.


Systemic veins: Within normal limits.


Heart: The heart is normal in size. No pericardial effusion.





Chest:


Lungs/Pleura: Patchy and confluent groundglass and consolidative opacities in 

the apical bibasilar gradient. Small bilateral pleural effusions with associated

 passive atelectasis. Central airways clear. No pneumothorax.





Mediastinum: Numerous enlarged mediastinal and hilar adenopathy measuring up to 

1.6 cm in short axis in the right lower paratracheal station and up to 1.2 cm in

 short axis in the right hilum and up to 1 cm in short axis in the left hilum. 

The visualized thyroid and the esophagus are unremarkable


Axilla/Soft Tissue: No supraclavicular or axillary adenopathy. Regional soft 

tissues are within normal limits. Benign bilateral gynecomastia.


Upper abdomen: The visualized upper abdomen appears unremarkable.


Bones: No evidence of acute fractures or aggressive osseous lesions. Median 

sternotomy wires are intact.





IMPRESSION:


Vascular:


1. No evidence of pulmonary embolism.


2. Main portal trunk is dilated up to 3.3 cm, suggestive of pulmonary arterial 

hypertension.


Chest:


1. Findings of multifocal pneumonia with small bilateral parapneumonic 

effusions.


2. Numerous enlarged likely reactive mediastinal and hilar lymph nodes. 

Recommend follow to resolution to exclude underlying malignancy.





PRQS compliance statement - One or more of the following individualized dose 

reduction techniques were utilized for this study:


1.  Automated exposure control


2.  Adjustment of the mA and/or kV according to patient size


3.  Use of iterative reconstruction technique








Electronically signed by: Shaw Layne DO (12/31/2021 8:53 PM) [facility]


[]


 (CARLA BURLESON)





Course & Med Decision Making:


Course & Med Decision Making


Pertinent Labs and Imaging studies reviewed. (See chart for details)





2110 spoke to Dr. Bran patient will be admitted for respiratory distress and 

pneumonia patient will be placed on antibiotics as well.


 (CARLA BURLESON)





Dragon Disclaimer:


Dragon Disclaimer:


This electronic medical record was generated, in whole or in part, using a voice

 recognition dictation system.


 (CARLA BURLESON)





Departure


Departure


Impression:  


   Primary Impression:  


   Respiratory distress


   Additional Impressions:  


   COVID-19


   Pneumonia


   Qualified Codes:  J18.9 - Pneumonia, unspecified organism


Disposition:  09 ADMITTED AS INPATIENT


Admitting Physician:  MARILEE


 (CARLA BURLESON)


Condition:  STABLE


Referrals:  


NO PCP (PCP)





Attending Signature


Attending Signature


I have reviewed the PA/NP's note and plan of care. I was available for 

consultation as needed during the patient's visit in the emergency department. I

 agree with the clinical impression, plan, and disposition.


 (SHERWIN RIVERA DO)











CARLA BURLESON       Dec 31, 2021 18:15


SHERWIN RIVERA DO              Jan 1, 2022 01:41

## 2021-12-31 NOTE — RAD
EXAMINATION: CT Pulmonary Angiogram with IV contrast



INDICATION: Reason: Shortness of breath / Spl. Instructions: IV omni 350 80 mls / History: 



COMPARISON: Same day chest radiograph



TECHNIQUE: Using helical technique, CT data from the thoracic inlet through the upper abdomen was obt
ained during rapid IV contrast infusion. The examination was timed to the pulmonary arterial system t
o generate a CT angiographic study. 3D MIPS, sagittal and coronal reformats were generated.

 

FINDINGS:

Vascular:     

The study is diagnostic to the level of the subsegmental pulmonary arteries. Adequate opacification o
f pulmonary arteries.

Pulmonary arteries: No evidence of acute or chronic pulmonary embolism. Main pulmonary trunk is dilat
ed up to 3.3 cm can be seen in setting of pulmonary hypertension.

Thoracic aorta: Normal in size.

Coronary arteries: Normal origins. Mild calcified coronary atherosclerosis.

Systemic veins: Within normal limits.

Heart: The heart is normal in size. No pericardial effusion.



Chest:

Lungs/Pleura: Patchy and confluent groundglass and consolidative opacities in the apical bibasilar gr
adient. Small bilateral pleural effusions with associated passive atelectasis. Central airways clear.
 No pneumothorax.



Mediastinum: Numerous enlarged mediastinal and hilar adenopathy measuring up to 1.6 cm in short axis 
in the right lower paratracheal station and up to 1.2 cm in short axis in the right hilum and up to 1
 cm in short axis in the left hilum. The visualized thyroid and the esophagus are unremarkable

Axilla/Soft Tissue: No supraclavicular or axillary adenopathy. Regional soft tissues are within jewel
l limits. Benign bilateral gynecomastia.

Upper abdomen: The visualized upper abdomen appears unremarkable.

Bones: No evidence of acute fractures or aggressive osseous lesions. Median sternotomy wires are inta
ct.



IMPRESSION:

Vascular:

1. No evidence of pulmonary embolism.

2. Main portal trunk is dilated up to 3.3 cm, suggestive of pulmonary arterial hypertension.

Chest:

1. Findings of multifocal pneumonia with small bilateral parapneumonic effusions.

2. Numerous enlarged likely reactive mediastinal and hilar lymph nodes. Recommend follow to resolutio
n to exclude underlying malignancy.



PRQS compliance statement - One or more of the following individualized dose reduction techniques wer
e utilized for this study:

1.  Automated exposure control

2.  Adjustment of the mA and/or kV according to patient size

3.  Use of iterative reconstruction technique





Electronically signed by: Shaw Layne DO (12/31/2021 8:53 PM) [facility]

## 2022-01-01 VITALS — SYSTOLIC BLOOD PRESSURE: 124 MMHG | DIASTOLIC BLOOD PRESSURE: 64 MMHG

## 2022-01-01 VITALS — SYSTOLIC BLOOD PRESSURE: 158 MMHG | DIASTOLIC BLOOD PRESSURE: 72 MMHG

## 2022-01-01 VITALS — SYSTOLIC BLOOD PRESSURE: 161 MMHG | DIASTOLIC BLOOD PRESSURE: 71 MMHG

## 2022-01-01 VITALS — SYSTOLIC BLOOD PRESSURE: 174 MMHG | DIASTOLIC BLOOD PRESSURE: 82 MMHG

## 2022-01-01 VITALS — SYSTOLIC BLOOD PRESSURE: 107 MMHG | DIASTOLIC BLOOD PRESSURE: 62 MMHG

## 2022-01-01 VITALS — DIASTOLIC BLOOD PRESSURE: 89 MMHG | SYSTOLIC BLOOD PRESSURE: 161 MMHG

## 2022-01-01 RX ADMIN — CHLORHEXIDINE GLUCONATE SCH ML: 1.2 RINSE ORAL at 21:25

## 2022-01-01 RX ADMIN — GABAPENTIN SCH MG: 400 CAPSULE ORAL at 21:31

## 2022-01-01 RX ADMIN — DOXYCYCLINE HYCLATE SCH MG: 100 TABLET, COATED ORAL at 21:26

## 2022-01-01 RX ADMIN — Medication SCH CAP: at 11:22

## 2022-01-01 RX ADMIN — BENZONATATE SCH MG: 100 CAPSULE, LIQUID FILLED ORAL at 21:26

## 2022-01-01 RX ADMIN — DOXYCYCLINE HYCLATE SCH MG: 100 TABLET, COATED ORAL at 11:57

## 2022-01-01 RX ADMIN — Medication SCH MG: at 11:22

## 2022-01-01 RX ADMIN — ENOXAPARIN SODIUM SCH MG: 40 INJECTION SUBCUTANEOUS at 11:23

## 2022-01-01 RX ADMIN — ZINC SULFATE CAP 220 MG (50 MG ELEMENTAL ZN) SCH MG: 220 (50 ZN) CAP at 11:21

## 2022-01-01 RX ADMIN — INSULIN GLARGINE SCH UNIT: 100 INJECTION, SOLUTION SUBCUTANEOUS at 13:00

## 2022-01-01 RX ADMIN — DEXAMETHASONE SODIUM PHOSPHATE SCH MG: 4 INJECTION, SOLUTION INTRAMUSCULAR; INTRAVENOUS at 11:23

## 2022-01-01 RX ADMIN — CHLORHEXIDINE GLUCONATE SCH ML: 1.2 RINSE ORAL at 11:22

## 2022-01-01 RX ADMIN — INSULIN LISPRO SCH UNITS: 100 INJECTION, SOLUTION INTRAVENOUS; SUBCUTANEOUS at 18:01

## 2022-01-01 RX ADMIN — LISINOPRIL SCH MG: 20 TABLET ORAL at 11:22

## 2022-01-01 RX ADMIN — INSULIN LISPRO SCH UNITS: 100 INJECTION, SOLUTION INTRAVENOUS; SUBCUTANEOUS at 18:02

## 2022-01-01 RX ADMIN — GABAPENTIN SCH MG: 400 CAPSULE ORAL at 13:12

## 2022-01-01 RX ADMIN — Medication SCH CAP: at 21:25

## 2022-01-01 RX ADMIN — ASPIRIN 81 MG SCH MG: 81 TABLET ORAL at 11:22

## 2022-01-01 RX ADMIN — CLOPIDOGREL BISULFATE SCH MG: 75 TABLET ORAL at 11:23

## 2022-01-01 RX ADMIN — INSULIN LISPRO SCH UNITS: 100 INJECTION, SOLUTION INTRAVENOUS; SUBCUTANEOUS at 12:59

## 2022-01-01 NOTE — EKG
[facility]

              [address] [place] [address]

Test Date:    2021               Test Time:    18:27:07

Pat Name:     ALEXEI SAMS         Department:   

Patient ID:   [id number]           Room:         521 1

Gender:       M                        Technician:   

:          [id number]               Requested By: CARLA BURLESON

Order Number: [id number]           Reading MD:   Paul Benavidez

                                 Measurements

Intervals                              Axis          

Rate:         100                      P:            43

RI:           126                      QRS:          50

QRSD:         86                       T:            34

QT:           336                                    

QTc:          436                                    

                           Interpretive Statements

SINUS RHYTHM

LEFT ATRIAL ABNORMALITY

Electronically Signed On 2022 10:20:28 CST by Paul Benavidez

## 2022-01-01 NOTE — PDOC1
History and Physical


Date of Admission


Date of Admission


DATE: 1/1/22 


TIME: 10:02





Source


Source:  Chart review, Patient





History of Present Illness


History of Present Illness


 Mr. Wild  is a 61-year-old male admit with hypoxia and COVID,   7 days of 

weaknes and cough,   


Brought by  EMS with shortness of air, weakness diarrhea. 


. Patient was in the ER 12/28, dx  with COVID-19, started outpateint tx.


Now complins of diarrhea and increased shortness of breath. Patient states he 

continues to have body aches and pains and running a fever. 


He can barely eat bites this AM, and is sitting with loose yellow stool on his 

chuch in bed,  markedly weak


IN field Sa02 was 74% he was then placed on 6 L nasal cannula





Past Medical History


Cardiovascular:  CAD, HTN, Other


Pulmonary:  No pertinent hx


CENTRAL NERVOUS SYSTEM:  Periperal neuropathy


GI:  GERD


Heme/Onc:  No pertinent hx


Hepatobiliary:  No pertinent hx


Psych:  No pertinent hx


Musculoskeletal:  Other


Rheumatologic:  No pertinent hx


Infectious disease:  No pertinent hx


Renal/:  No pertinent hx


Endocrine:  Diabetes





Past Surgical History


Past Surgical History:  CABG





Family History


Family History:  Coronary Artery Disease





Social History


Smoke:  No


ALCOHOL:  none


Drugs:  None





Current Problem List


Problem List


Problems


Medical Problems:


(1) COVID-19


Status: Acute  





(2) Pneumonia


Status: Acute  





(3) Respiratory distress


Status: Acute  











Current Medications


Current Medications





Current Medications


Dexamethasone Sodium Phosphate (Decadron) 10 mg 1X  ONCE IV  Last administered 

on 12/31/21at 20:54;  Start 12/31/21 at 19:30;  Stop 12/31/21 at 19:31;  Status 

DC


Sodium Chloride 1,000 ml @  1,000 mls/hr 1X  ONCE IV  Last administered on 

12/31/21at 20:54;  Start 12/31/21 at 19:30;  Stop 12/31/21 at 20:29;  Status DC


Iohexol (Omnipaque 350 Mg/ml) 80 ml 1X  ONCE IV  Last administered on 12/31/21at

20:26;  Start 12/31/21 at 20:00;  Stop 12/31/21 at 20:01;  Status DC


Info (CONTRAST GIVEN -- Rx MONITORING) 1 each PRN DAILY  PRN MC SEE COMMENTS;  

Start 12/31/21 at 20:00;  Stop 1/2/22 at 19:59


Ceftriaxone Sodium (Rocephin) 1 gm 1X  ONCE IVP  Last administered on 12/31/21at

22:16;  Start 12/31/21 at 21:30;  Stop 12/31/21 at 21:31;  Status DC


Doxycycline Hyclate 100 mg/ Dextrose 100 ml @  50 mls/hr 1X  ONCE IV  Last 

administered on 12/31/21at 22:16;  Start 12/31/21 at 21:30;  Stop 12/31/21 at 

23:29;  Status DC


Dexamethasone Sodium Phosphate (Decadron) 6 mg DAILY IVP ;  Start 1/1/22 at 

10:30


Ascorbic Acid (Vitamin C) 1,000 mg DAILY PO ;  Start 1/1/22 at 10:30


Zinc Sulfate (Orazinc) 220 mg DAILY PO ;  Start 1/1/22 at 10:30


Enoxaparin Sodium (Lovenox Per Pharmacy Prophylaxis Dosing) 1 each PRN DAILY  

PRN MC SEE COMMENTS;  Start 1/1/22 at 10:00


Ceftriaxone Sodium (Rocephin) 1 gm Q24H IVP ;  Start 1/1/22 at 21:00


Doxycycline Hyclate (Vibra-Tab) 100 mg BID PO ;  Start 1/1/22 at 10:30


Sodium Chloride 1,000 ml @  100 mls/hr 1X  ONCE IV ;  Start 1/1/22 at 10:00;  

Stop 1/1/22 at 19:59


Aspirin (Aspirin Chewable) 81 mg DAILY PO ;  Start 1/1/22 at 10:30


Chlorhexidine Gluconate (Peridex) 15 ml BID SWSP ;  Start 1/1/22 at 10:30


Clopidogrel Bisulfate (Plavix) 75 mg DAILYWBKFT PO ;  Start 1/1/22 at 10:30


Duloxetine HCl (Cymbalta) 30 mg QHS PO ;  Start 1/1/22 at 21:00


Acetaminophen/ Hydrocodone Bitart (Lortab 5/325) 1 tab PRN Q6HRS  PRN PO PAIN;  

Start 1/1/22 at 10:00


Lisinopril (Prinivil) 20 mg DAILY PO ;  Start 1/1/22 at 10:30


Gabapentin (Neurontin) 800 mg Q8HRS PO ;  Start 1/1/22 at 14:00


Non-Formulary Medication (Glipizide ) 1 tab BID PO ;  Start 1/1/22 at 21:00;  

Stop 1/1/22 at 10:00;  Status DC


Non-Formulary Medication (Sitagliptin Phos/Metformin Hcl (Janumet 50-1,000 Mg 

Tablet)) 1 tab BID PO ;  Start 1/1/22 at 21:00;  Stop 1/1/22 at 10:00;  Status 

DC


Insulin Glargine (Lantus Syringe) 40 unit DAILY SQ ;  Start 1/1/22 at 10:00


Insulin Human Lispro (HumaLOG) 15 units TIDAC SQ ;  Start 1/1/22 at 11:30


Insulin Human Lispro (HumaLOG) 0-9 UNITS TIDWMEALS SQ ;  Start 1/1/22 at 12:00


Dextrose (Dextrose 50%-Water Syringe) 12.5 gm PRN Q15MIN  PRN IV SEE COMMENTS;  

Start 1/1/22 at 10:00





Active Scripts


Active


Azithromycin Tablet (Azithromycin) 250 Mg Tablet 1 Pkg PO UD 5 Days


     2 the first day followed by 1 for days 2-5


Hydrocodone-Apap 5-325  ** (Hydrocodone Bit/Acetaminophen) 1 Tab Tablet 1 Tab PO

PRN Q6HRS PRN 3 Days


Keflex (Cephalexin) 500 Mg Capsule 1 Cap PO TID 10 Days


Norco 5-325 Tablet (Acetaminophen/Hydrocodone Bitart) 1 Each Tablet 1 Tab PO PRN

Q6HRS PRN


Keflex (Cephalexin) 500 Mg Capsule 1 Cap PO TID 7 Days


Peridex (Chlorhexidine Gluconate) 15 Ml Mouthwash 15 Ml SWSP BID 7 Days


     Brush teeth before using to prevent staining. Swish for


     approximately 30 seconds before spitting.


Naproxen 500 Mg Tablet 1 Tab PO BID PRN 10 Days


Clopidogrel (Clopidogrel Bisulfate) 75 Mg Tablet 75 Mg PO DAILYWBKFT 30 Days


Reported


Viagra (Sildenafil Citrate) 100 Mg Tablet 1 Tab PO PRN DAILY


Nystatin 15 Gm Powder 1 Qasim TP BID


Mupirocin Ointment (Mupirocin) 22 Gm Oint...g. 1 Qasim TP DAILY


Lisinopril 20 Mg Tablet 1 Tab PO DAILY


Gabapentin 800 Mg Tablet 800 Mg PO Q8HRS


Atorvastatin Calcium 80 Mg Tablet 1 Tab PO DAILY


Janumet 50-1,000 Mg Tablet (Sitagliptin Phos/Metformin Hcl) 1 Each Tablet 1 Tab 

PO BID


Pentoxifylline 400 Mg Tablet.er 400 Mg PO TID


Cymbalta (Duloxetine Hcl) 30 Mg Capsule.dr 30 Mg PO QHS


Glipizide 10 Mg Tablet 1 Tab PO BID


Aspirin 81 Mg Tab.chew 1 Tab PO DAILY





Allergies


Allergies:  


Coded Allergies:  


     No Known Drug Allergies (Unverified , 9/24/18)





ROS


General:  YES: Chills, Fatigue, Malaise


PSYCHOLOGICAL ROS:  No: Anxiety, Behavioral Disorder, Concentration difficultie,

Decreased libido, Depression, Disorientation, Hallucinations, Hostility, 

Irritablity, Memory difficulties, Mood Swings, Obsessive thoughts, Physical 

abuse, Sexual abuse, Sleep disturbances, Suicidal ideation, Other


Eyes:  No Blurry vision, No Decreased vision, No Double vision, No Dry eyes, No 

Excessive tearing, No Eye Pain, No Itchy Eyes, No Loss of vision, No 

Photophobia, No Scotomata, No Uses contacts, No Uses glasses, No Other


HEENT:  YES: Heacaches


Cardiovascular:  No Chest Pain, No Palpitations, No Orthopnea, No Paroxysmal 

Noc. Dyspnea, No Edema, No Lt Headedness, No Other


Gastrointestinal:  Yes Nausea, Yes Abdominal Pain, Yes Diarrhea; 


   No Vomiting, No Constipation, No Melena, No Hematochezia, No Other


Genitourinary:  No Dysuria, No Frequency, No Incontinence, No Hematuria, No 

Retention, No Discharge, No Urgency, No Pain, No Flank Pain, No Other, No , No ,

No , No , No , No , No 


Musculoskeletal:  No Gait Disturbance, No Joint Pain, No Joint Stiffness, No 

Joint Swelling, No Muscle Pain, No Muscular Weakness, No Pain In:, No Swelling 

In:, No Other


Neurological:  No Behavorial Changes, No Bowel/Bladder ControlChng, No 

Confusion, No Dizziness, No Gait Disturbance, No Headaches, No Impaired 

Coord/balance, No Memory Loss, No Numbness/Tingling, No Seizures, No Speech 

Problems, No Tremors, No Visual Changes, No Weakness, No Other


Skin:  Yes Dry Skin; 


   No Hair Changes, No Lumps, No Mole Changes, No Mottling, No Nail Changes, No 

Pruritus, No Rash, No Skin Lesion Changes, No Other, No Acne





Physical Exam


General:  Alert, Cooperative, moderate distress


HEENT:  PERRLA, Mucous membr. moist/pink


Lungs:  Clear to auscultation, Normal air movement


Heart:  S1S2, no gallops


Abdomen:  Soft


Rectal Exam:  not examined


Extremities:  No clubbing, No edema


Skin:  No rashes, No significant lesion


Neuro:  Normal speech, Normal tone, Cranial nerves 3-12 NL


Psych/Mental Status:  Mood NL





Vitals


Vitals





Vital Signs








  Date Time  Temp Pulse Resp B/P (MAP) Pulse Ox O2 Delivery O2 Flow Rate FiO2


 


1/1/22 07:00 98.1 77 22 161/71 (101) 91 Nasal Cannula 6.0 





 98.1       











Labs


Labs





Laboratory Tests








Test


 12/31/21


18:13 1/1/22


04:00


 


White Blood Count


 10.0 x10^3/uL


(4.0-11.0) 





 


Red Blood Count


 5.02 x10^6/uL


(4.30-5.70) 





 


Hemoglobin


 14.2 g/dL


(13.0-17.5) 





 


Hematocrit


 42.2 %


(39.0-53.0) 





 


Mean Corpuscular Volume 84 fL ()  


 


Mean Corpuscular Hemoglobin 28 pg (25-35)  


 


Mean Corpuscular Hemoglobin


Concent 34 g/dL


(31-37) 





 


Red Cell Distribution Width


 14.6 %


(11.5-14.5) 





 


Platelet Count


 187 x10^3/uL


(140-400) 





 


Neutrophils (%) (Auto) 92 % (31-73)  


 


Lymphocytes (%) (Auto) 6 % (24-48)  


 


Monocytes (%) (Auto) 2 % (0-9)  


 


Eosinophils (%) (Auto) 0 % (0-3)  


 


Basophils (%) (Auto) 0 % (0-3)  


 


Neutrophils # (Auto)


 9.2 x10^3/uL


(1.8-7.7) 





 


Lymphocytes # (Auto)


 0.6 x10^3/uL


(1.0-4.8) 





 


Monocytes # (Auto)


 0.2 x10^3/uL


(0.0-1.1) 





 


Eosinophils # (Auto)


 0.0 x10^3/uL


(0.0-0.7) 





 


Basophils # (Auto)


 0.0 x10^3/uL


(0.0-0.2) 





 


Sodium Level


 132 mmol/L


(136-145) 





 


Potassium Level


 4.3 mmol/L


(3.5-5.1) 





 


Chloride Level


 96 mmol/L


() 





 


Carbon Dioxide Level


 25 mmol/L


(21-32) 





 


Anion Gap 11 (6-14)  


 


Blood Urea Nitrogen


 14 mg/dL


(8-26) 





 


Creatinine


 1.4 mg/dL


(0.7-1.3) 





 


Estimated GFR


(Cockcroft-Gault) 51.2 


 





 


BUN/Creatinine Ratio 10 (6-20)  


 


Glucose Level


 355 mg/dL


(70-99) 





 


Lactic Acid Level


 2.8 mmol/L


(0.4-2.0) 1.8 mmol/L


(0.4-2.0)


 


Calcium Level


 7.6 mg/dL


(8.5-10.1) 





 


Total Bilirubin


 0.5 mg/dL


(0.2-1.0) 





 


Aspartate Amino Transf


(AST/SGOT) 60 U/L (15-37) 


 





 


Alanine Aminotransferase


(ALT/SGPT) 24 U/L (16-63) 


 





 


Alkaline Phosphatase


 213 U/L


() 





 


Troponin I High Sensitivity


 169 ng/L


(4-75) 162 ng/L


(4-75)


 


NT-Pro-B-Type Natriuretic


Peptide 5627 pg/mL


(0-124) 





 


Total Protein


 7.1 g/dL


(6.4-8.2) 





 


Albumin


 2.4 g/dL


(3.4-5.0) 





 


Albumin/Globulin Ratio 0.5 (1.0-1.7)  








Laboratory Tests








Test


 12/31/21


18:13 1/1/22


04:00


 


White Blood Count


 10.0 x10^3/uL


(4.0-11.0) 





 


Red Blood Count


 5.02 x10^6/uL


(4.30-5.70) 





 


Hemoglobin


 14.2 g/dL


(13.0-17.5) 





 


Hematocrit


 42.2 %


(39.0-53.0) 





 


Mean Corpuscular Volume 84 fL ()  


 


Mean Corpuscular Hemoglobin 28 pg (25-35)  


 


Mean Corpuscular Hemoglobin


Concent 34 g/dL


(31-37) 





 


Red Cell Distribution Width


 14.6 %


(11.5-14.5) 





 


Platelet Count


 187 x10^3/uL


(140-400) 





 


Neutrophils (%) (Auto) 92 % (31-73)  


 


Lymphocytes (%) (Auto) 6 % (24-48)  


 


Monocytes (%) (Auto) 2 % (0-9)  


 


Eosinophils (%) (Auto) 0 % (0-3)  


 


Basophils (%) (Auto) 0 % (0-3)  


 


Neutrophils # (Auto)


 9.2 x10^3/uL


(1.8-7.7) 





 


Lymphocytes # (Auto)


 0.6 x10^3/uL


(1.0-4.8) 





 


Monocytes # (Auto)


 0.2 x10^3/uL


(0.0-1.1) 





 


Eosinophils # (Auto)


 0.0 x10^3/uL


(0.0-0.7) 





 


Basophils # (Auto)


 0.0 x10^3/uL


(0.0-0.2) 





 


Sodium Level


 132 mmol/L


(136-145) 





 


Potassium Level


 4.3 mmol/L


(3.5-5.1) 





 


Chloride Level


 96 mmol/L


() 





 


Carbon Dioxide Level


 25 mmol/L


(21-32) 





 


Anion Gap 11 (6-14)  


 


Blood Urea Nitrogen


 14 mg/dL


(8-26) 





 


Creatinine


 1.4 mg/dL


(0.7-1.3) 





 


Estimated GFR


(Cockcroft-Gault) 51.2 


 





 


BUN/Creatinine Ratio 10 (6-20)  


 


Glucose Level


 355 mg/dL


(70-99) 





 


Lactic Acid Level


 2.8 mmol/L


(0.4-2.0) 1.8 mmol/L


(0.4-2.0)


 


Calcium Level


 7.6 mg/dL


(8.5-10.1) 





 


Total Bilirubin


 0.5 mg/dL


(0.2-1.0) 





 


Aspartate Amino Transf


(AST/SGOT) 60 U/L (15-37) 


 





 


Alanine Aminotransferase


(ALT/SGPT) 24 U/L (16-63) 


 





 


Alkaline Phosphatase


 213 U/L


() 





 


Troponin I High Sensitivity


 169 ng/L


(4-75) 162 ng/L


(4-75)


 


NT-Pro-B-Type Natriuretic


Peptide 5627 pg/mL


(0-124) 





 


Total Protein


 7.1 g/dL


(6.4-8.2) 





 


Albumin


 2.4 g/dL


(3.4-5.0) 





 


Albumin/Globulin Ratio 0.5 (1.0-1.7)  











VTE Prophylaxis Ordered


VTE Prophylaxis Devices:  No


VTE Pharmacological Prophylaxi:  Yes





Assessment/Plan


Assessment/Plan


acute hypoxic respiratory failure


COVID pneumonia


DM2,     acute hyperglycemia


eakness


diarrhea





Justifications for Admission


Other Justification














ELICEO ALONSO MD                  Jan 1, 2022 10:07

## 2022-01-02 VITALS — SYSTOLIC BLOOD PRESSURE: 154 MMHG | DIASTOLIC BLOOD PRESSURE: 79 MMHG

## 2022-01-02 VITALS — DIASTOLIC BLOOD PRESSURE: 74 MMHG | SYSTOLIC BLOOD PRESSURE: 130 MMHG

## 2022-01-02 VITALS — DIASTOLIC BLOOD PRESSURE: 85 MMHG | SYSTOLIC BLOOD PRESSURE: 159 MMHG

## 2022-01-02 VITALS — DIASTOLIC BLOOD PRESSURE: 66 MMHG | SYSTOLIC BLOOD PRESSURE: 140 MMHG

## 2022-01-02 VITALS — DIASTOLIC BLOOD PRESSURE: 79 MMHG | SYSTOLIC BLOOD PRESSURE: 151 MMHG

## 2022-01-02 VITALS — SYSTOLIC BLOOD PRESSURE: 178 MMHG | DIASTOLIC BLOOD PRESSURE: 89 MMHG

## 2022-01-02 PROCEDURE — XW033E5 INTRODUCTION OF REMDESIVIR ANTI-INFECTIVE INTO PERIPHERAL VEIN, PERCUTANEOUS APPROACH, NEW TECHNOLOGY GROUP 5: ICD-10-PCS | Performed by: INTERNAL MEDICINE

## 2022-01-02 RX ADMIN — DEXAMETHASONE SODIUM PHOSPHATE SCH MG: 4 INJECTION, SOLUTION INTRAMUSCULAR; INTRAVENOUS at 08:17

## 2022-01-02 RX ADMIN — ASPIRIN 81 MG SCH MG: 81 TABLET ORAL at 08:16

## 2022-01-02 RX ADMIN — ZINC SULFATE CAP 220 MG (50 MG ELEMENTAL ZN) SCH MG: 220 (50 ZN) CAP at 09:04

## 2022-01-02 RX ADMIN — OXYCODONE HYDROCHLORIDE AND ACETAMINOPHEN PRN TAB: 5; 325 TABLET ORAL at 18:02

## 2022-01-02 RX ADMIN — INSULIN LISPRO SCH UNITS: 100 INJECTION, SOLUTION INTRAVENOUS; SUBCUTANEOUS at 09:11

## 2022-01-02 RX ADMIN — INSULIN LISPRO SCH UNITS: 100 INJECTION, SOLUTION INTRAVENOUS; SUBCUTANEOUS at 18:00

## 2022-01-02 RX ADMIN — GABAPENTIN SCH MG: 400 CAPSULE ORAL at 14:42

## 2022-01-02 RX ADMIN — INSULIN LISPRO SCH UNITS: 100 INJECTION, SOLUTION INTRAVENOUS; SUBCUTANEOUS at 17:59

## 2022-01-02 RX ADMIN — REMDESIVIR SCH MLS/HR: 100 INJECTION, POWDER, LYOPHILIZED, FOR SOLUTION INTRAVENOUS at 11:48

## 2022-01-02 RX ADMIN — CLOPIDOGREL BISULFATE SCH MG: 75 TABLET ORAL at 08:15

## 2022-01-02 RX ADMIN — CHLORHEXIDINE GLUCONATE SCH ML: 1.2 RINSE ORAL at 21:54

## 2022-01-02 RX ADMIN — INSULIN LISPRO SCH UNITS: 100 INJECTION, SOLUTION INTRAVENOUS; SUBCUTANEOUS at 11:50

## 2022-01-02 RX ADMIN — DOXYCYCLINE HYCLATE SCH MG: 100 TABLET, COATED ORAL at 21:56

## 2022-01-02 RX ADMIN — GABAPENTIN SCH MG: 400 CAPSULE ORAL at 21:56

## 2022-01-02 RX ADMIN — GUAIFENESIN AND CODEINE PHOSPHATE PRN ML: 10; 100 LIQUID ORAL at 14:55

## 2022-01-02 RX ADMIN — INSULIN GLARGINE SCH UNIT: 100 INJECTION, SOLUTION SUBCUTANEOUS at 21:55

## 2022-01-02 RX ADMIN — INSULIN GLARGINE SCH UNIT: 100 INJECTION, SOLUTION SUBCUTANEOUS at 09:09

## 2022-01-02 RX ADMIN — CHLORHEXIDINE GLUCONATE SCH ML: 1.2 RINSE ORAL at 11:47

## 2022-01-02 RX ADMIN — BENZONATATE SCH MG: 100 CAPSULE, LIQUID FILLED ORAL at 08:16

## 2022-01-02 RX ADMIN — GABAPENTIN SCH MG: 400 CAPSULE ORAL at 06:25

## 2022-01-02 RX ADMIN — DOXYCYCLINE HYCLATE SCH MG: 100 TABLET, COATED ORAL at 08:15

## 2022-01-02 RX ADMIN — ENOXAPARIN SODIUM SCH MG: 40 INJECTION SUBCUTANEOUS at 11:48

## 2022-01-02 RX ADMIN — BENZONATATE SCH MG: 100 CAPSULE, LIQUID FILLED ORAL at 14:43

## 2022-01-02 RX ADMIN — Medication SCH CAP: at 21:56

## 2022-01-02 RX ADMIN — Medication SCH CAP: at 08:17

## 2022-01-02 RX ADMIN — BENZONATATE SCH MG: 100 CAPSULE, LIQUID FILLED ORAL at 21:56

## 2022-01-02 RX ADMIN — Medication SCH MG: at 09:04

## 2022-01-02 RX ADMIN — LISINOPRIL SCH MG: 20 TABLET ORAL at 08:17

## 2022-01-02 NOTE — PDOC
[facility] PROGRESS NOTE


Date of Service


DOS:


DATE: 1/2/22 


TIME: 13:50





Chief Complaint


Chief Complaint


acute hypoxic respiratory failure


COVID pneumonia


DM2,     acute hyperglycemia


acute weakness


diarrhea





History of Present Illness


History of Present Illness


increase the lantus and SSI and meal


add percocet and Robitsussin with codeine


he looks worse, weak,  can barely eat,  poor po intake,  on IV fluids, 


blood sugar high,  may transtion to PPN if that improved,





Vitals/I&O


Vitals/I&O:





                                   Vital Signs








  Date Time  Temp Pulse Resp B/P (MAP) Pulse Ox O2 Delivery O2 Flow Rate FiO2


 


1/2/22 11:00 98.1 74 18 140/66 (90) 95 Nasal Cannula 6.0 





 98.1       














                                    I & O   


 


 1/1/22 1/1/22 1/2/22





 15:00 23:00 07:00


 


Intake Total 480 ml 240 ml 0 ml


 


Output Total  200 ml 


 


Balance 480 ml 40 ml 0 ml











Physical Exam


General:  Alert, Cooperative, moderate distress


Lungs:  Clear


Abdomen:  Soft


Extremities:  No clubbing, No edema


Skin:  No rashes, No significant lesion





Labs


Labs:





Laboratory Tests








Test


 1/1/22


17:32 1/1/22


20:20 1/2/22


07:52 1/2/22


11:10


 


Glucose (Fingerstick)


 297 mg/dL


(70-99) 252 mg/dL


(70-99) 293 mg/dL


(70-99) 293 mg/dL


(70-99)











Assessment and Plan


Assessmemt and Plan


Problems


Medical Problems:


(1) COVID-19


Status: Acute  





(2) Pneumonia


Status: Acute  





(3) Respiratory distress


Status: Acute  











Comment


Review of Relevant


I have reviewed the following items zainab (where applicable) has been applied.


Medications:





Current Medications








 Medications


  (Trade)  Dose


 Ordered  Sig/Makenzie


 Route


 PRN Reason  Start Time


 Stop Time Status Last Admin


Dose Admin


 


 Ceftriaxone Sodium


  (Rocephin)  1 gm  Q24H


 IVP


   1/1/22 21:00


 1/2/22 12:02 DC 1/1/22 21:34





 


 Duloxetine HCl


  (Cymbalta)  30 mg  QHS


 PO


   1/1/22 21:00


    1/1/22 21:26





 


 Gabapentin


  (Neurontin)  800 mg  Q8HRS


 PO


   1/1/22 14:00


    1/2/22 06:25





 


 Metronidazole


  (Flagyl)  500 mg  Q8HRS


 PO


   1/1/22 14:00


    1/2/22 06:25





 


 Remdesivir 100 mg/


 Sodium Chloride  230 ml @ 


 460 mls/hr  Q24H


 IV


   1/2/22 11:00


 1/5/22 11:29  1/2/22 11:48





 


 Benzonatate


  (Tessalon Perle)  100 mg  [id number]


 PO


   1/1/22 21:00


    1/2/22 08:16














Justifications for Admission


Other Justification














ELICEO ALONSO MD                  Jan 2, 2022 13:51

## 2022-01-02 NOTE — CONS
DATE OF CONSULTATION: 01/02/2022

ATTENDING PHYSICIAN:  Jumana Bran MD.



REASON FOR CONSULTATION:  The patient is seen in pulmonary consultation at the 

request of Dr. Bran for acute hypoxemic respiratory failure related to COVID-19

viral pneumonia.



HISTORY OF PRESENT ILLNESS:  The patient is a 61-year-old. Seven days ago, he 

had weakness and cough, was brought into Emergency Room on 12/28.  Tested 

positive for COVID, was discharged home.  At that time, he did not require any 

oxygen supplementation.  The patient represented, now is on 6 liters of oxygen. 

He underwent a CT angiogram, which I personally reviewed.  There is no evidence 

of pulmonary emboli.  There are bilateral infiltrates compatible with viral 

pneumonia.  There are also small bilateral pleural effusions.



The patient has not been vaccinated.  He normally does not wear oxygen at home. 

He denies current hemoptysis.  No chest pain or pressure.



PAST MEDICAL HISTORY:  Coronary artery disease, hypertension, gastroesophageal 

reflux, diabetes.



PAST SURGICAL HISTORY:  Status post coronary artery bypass grafting.



FAMILY HISTORY:  Coronary artery disease.



ALLERGIES:  No known drug allergies.



REVIEW OF SYSTEMS:  As indicated above, otherwise a 10-point system was reviewed

and negative.



CURRENT MEDICATIONS:  List was reviewed.  He is on remdesivir, ceftriaxone.



PHYSICAL EXAMINATION:

VITAL SIGNS:  Stable.  O2 saturation was greater than 92%.

LUNGS:  Anteriorly were clear.

CARDIOVASCULAR:  Regular rate and rhythm with S1, S2, no S3.

ABDOMEN:  Soft, nontender, nondistended.

EXTREMITIES:  No clubbing, cyanosis or edema.



LABORATORY DATA:  CT reviewed.  Labs were reviewed.  White count was normal, 

hemoglobin and hematocrit were noted.



IMPRESSION:

1.  Acute exacerbation of chronic obstructive pulmonary disease secondary to 

COVID-19 viral pneumonia.

2.  COVID-19 viral pneumonia, acute respiratory distress syndrome.

3.  Coronary artery disease, status post coronary artery bypass grafting.

4.  Diabetes.



PLAN:

1.  Continue support with oxygen supplementation.

2.  Dexamethasone.

3.  Finish course of remdesivir.

4.  Discontinue Rocephin.  Continue doxycycline.

5.  DVT prophylaxis.

6.  Monitor blood sugars.



I do appreciate the privilege in sharing in the patient's care.







CARMEN/TUNDE/LYUDMILA

DR: Nydia   DD: 01/02/2022 12:01

DT: 01/02/2022 12:51   TID: [id number]

## 2022-01-02 NOTE — PDOC
PULMONARY PROGRESS NOTES


DATE: 1/2/22 


TIME: 12:02


Vitals





Vital Signs








  Date Time  Temp Pulse Resp B/P (MAP) Pulse Ox O2 Delivery O2 Flow Rate FiO2


 


1/2/22 08:17  80  159/85    


 


1/2/22 07:00 97.5  20  93 Nasal Cannula 6.0 





 97.5       








Lungs:  Clear


Cardiovascular:  S1, S2


Labs





Laboratory Tests








Test


 12/31/21


18:13 1/1/22


04:00 1/1/22


12:03 1/1/22


17:32


 


White Blood Count


 10.0 x10^3/uL


(4.0-11.0) 


 


 





 


Red Blood Count


 5.02 x10^6/uL


(4.30-5.70) 


 


 





 


Hemoglobin


 14.2 g/dL


(13.0-17.5) 


 


 





 


Hematocrit


 42.2 %


(39.0-53.0) 


 


 





 


Mean Corpuscular Volume 84 fL ()    


 


Mean Corpuscular Hemoglobin 28 pg (25-35)    


 


Mean Corpuscular Hemoglobin


Concent 34 g/dL


(31-37) 


 


 





 


Red Cell Distribution Width


 14.6 %


(11.5-14.5) 


 


 





 


Platelet Count


 187 x10^3/uL


(140-400) 


 


 





 


Neutrophils (%) (Auto) 92 % (31-73)    


 


Lymphocytes (%) (Auto) 6 % (24-48)    


 


Monocytes (%) (Auto) 2 % (0-9)    


 


Eosinophils (%) (Auto) 0 % (0-3)    


 


Basophils (%) (Auto) 0 % (0-3)    


 


Neutrophils # (Auto)


 9.2 x10^3/uL


(1.8-7.7) 


 


 





 


Lymphocytes # (Auto)


 0.6 x10^3/uL


(1.0-4.8) 


 


 





 


Monocytes # (Auto)


 0.2 x10^3/uL


(0.0-1.1) 


 


 





 


Eosinophils # (Auto)


 0.0 x10^3/uL


(0.0-0.7) 


 


 





 


Basophils # (Auto)


 0.0 x10^3/uL


(0.0-0.2) 


 


 





 


Sodium Level


 132 mmol/L


(136-145) 


 


 





 


Potassium Level


 4.3 mmol/L


(3.5-5.1) 


 


 





 


Chloride Level


 96 mmol/L


() 


 


 





 


Carbon Dioxide Level


 25 mmol/L


(21-32) 


 


 





 


Anion Gap 11 (6-14)    


 


Blood Urea Nitrogen


 14 mg/dL


(8-26) 


 


 





 


Creatinine


 1.4 mg/dL


(0.7-1.3) 


 


 





 


Estimated GFR


(Cockcroft-Gault) 51.2 


 


 


 





 


BUN/Creatinine Ratio 10 (6-20)    


 


Glucose Level


 355 mg/dL


(70-99) 


 


 





 


Lactic Acid Level


 2.8 mmol/L


(0.4-2.0) 1.8 mmol/L


(0.4-2.0) 


 





 


Calcium Level


 7.6 mg/dL


(8.5-10.1) 


 


 





 


Total Bilirubin


 0.5 mg/dL


(0.2-1.0) 


 


 





 


Aspartate Amino Transf


(AST/SGOT) 60 U/L (15-37) 


 


 


 





 


Alanine Aminotransferase


(ALT/SGPT) 24 U/L (16-63) 


 


 


 





 


Alkaline Phosphatase


 213 U/L


() 


 


 





 


Troponin I High Sensitivity


 169 ng/L


(4-75) 162 ng/L


(4-75) 


 





 


NT-Pro-B-Type Natriuretic


Peptide 5627 pg/mL


(0-124) 


 


 





 


Total Protein


 7.1 g/dL


(6.4-8.2) 


 


 





 


Albumin


 2.4 g/dL


(3.4-5.0) 


 


 





 


Albumin/Globulin Ratio 0.5 (1.0-1.7)    


 


Glucose (Fingerstick)


 


 


 349 mg/dL


(70-99) 297 mg/dL


(70-99)


 


Test


 1/1/22


20:20 1/2/22


07:52 1/2/22


11:10 





 


Glucose (Fingerstick)


 252 mg/dL


(70-99) 293 mg/dL


(70-99) 293 mg/dL


(70-99) 











Laboratory Tests








Test


 1/1/22


12:03 1/1/22


17:32 1/1/22


20:20 1/2/22


07:52


 


Glucose (Fingerstick)


 349 mg/dL


(70-99) 297 mg/dL


(70-99) 252 mg/dL


(70-99) 293 mg/dL


(70-99)


 


Test


 1/2/22


11:10 


 


 





 


Glucose (Fingerstick)


 293 mg/dL


(70-99) 


 


 











Medications





Active Scripts








 Medications  Dose


 Route/Sig


 Max Daily Dose Days Date Category Dose


Instructions


 


 Azithromycin


 Tablet


  (Azithromycin)


 250 Mg Tablet  1 Pkg


 PO UD


  5 12/29/21 Rx  2 the first day followed by 1 for days 2-5


 


 Hydrocodone-Apap


 5-325  **


  (Hydrocodone


 Bit/Acetaminophen)


 1 Tab Tablet  1 Tab


 PO PRN Q6HRS PRN


  3 5/28/21 Rx 


 


 Keflex


  (Cephalexin) 500


 Mg Capsule  1 Cap


 PO TID


  10 6/28/20 Rx 


 


 Norco 5-325


 Tablet


  (Acetaminophen/Hydrocodone


 Bitart) 1 Each


 Tablet  1 Tab


 PO PRN Q6HRS PRN


   4/6/20 Rx 


 


 Keflex


  (Cephalexin) 500


 Mg Capsule  1 Cap


 PO TID


  7 12/4/19 Rx 


 


 Peridex


  (Chlorhexidine


 Gluconate) 15 Ml


 Mouthwash  15 Ml


 SWSP BID


  7 12/4/19 Rx  Brush teeth before using to prevent staining. Swish for


 approximately 30 seconds before spitting.


 


 Naproxen 500 Mg


 Tablet  1 Tab


 PO BID PRN


  10 12/4/19 Rx 


 


 Clopidogrel


  (Clopidogrel


 Bisulfate) 75 Mg


 Tablet  75 Mg


 PO DAILYWBKFT


  30 6/24/19 Rx 


 


 Viagra


  (Sildenafil


 Citrate) 100 Mg


 Tablet  1 Tab


 PO PRN DAILY


   6/24/19 Reported 


 


 Nystatin 15 Gm


 Powder  1 Qasim


 TP BID


   6/24/19 Reported 


 


 Mupirocin


 Ointment


  (Mupirocin) 22 Gm


 Oint...g.  1 Qasim


 TP DAILY


   6/24/19 Reported 


 


 Lisinopril 20 Mg


 Tablet  1 Tab


 PO DAILY


   6/24/19 Reported 


 


 Gabapentin 800 Mg


 Tablet  800 Mg


 PO Q8HRS


   6/24/19 Reported 


 


 Atorvastatin


 Calcium 80 Mg


 Tablet  1 Tab


 PO DAILY


   6/24/19 Reported 


 


 Janumet 50-1,000


 Mg Tablet


  (Sitagliptin


 Phos/Metformin


 Hcl) 1 Each Tablet  1 Tab


 PO BID


   8/30/18 Reported 


 


 Pentoxifylline


 400 Mg Tablet.er  400 Mg


 PO TID


   8/30/18 Reported 


 


 Cymbalta


  (Duloxetine Hcl)


 30 Mg Capsule.dr  30 Mg


 PO QHS


   8/30/18 Reported 


 


 Glipizide 10 Mg


 Tablet  1 Tab


 PO BID


   8/30/18 Reported 


 


 Aspirin 81 Mg


 Tab.chew  1 Tab


 PO DAILY


   10/27/15 Reported 











Impression


.


Full consult dictated


Respiratory failure COVID-19 viral pneumonia


See orders











RAMBO ESPINOZA MD               Jan 2, 2022 12:02

## 2022-01-03 VITALS — DIASTOLIC BLOOD PRESSURE: 74 MMHG | SYSTOLIC BLOOD PRESSURE: 148 MMHG

## 2022-01-03 VITALS — DIASTOLIC BLOOD PRESSURE: 82 MMHG | SYSTOLIC BLOOD PRESSURE: 150 MMHG

## 2022-01-03 VITALS — SYSTOLIC BLOOD PRESSURE: 126 MMHG | DIASTOLIC BLOOD PRESSURE: 83 MMHG

## 2022-01-03 VITALS — SYSTOLIC BLOOD PRESSURE: 143 MMHG | DIASTOLIC BLOOD PRESSURE: 68 MMHG

## 2022-01-03 VITALS — SYSTOLIC BLOOD PRESSURE: 158 MMHG | DIASTOLIC BLOOD PRESSURE: 77 MMHG

## 2022-01-03 VITALS — SYSTOLIC BLOOD PRESSURE: 149 MMHG | DIASTOLIC BLOOD PRESSURE: 74 MMHG

## 2022-01-03 LAB
% ATYL: 1 % (ref 0–0)
% BANDS: 2 % (ref 0–9)
% LYMPHS: 6 % (ref 24–48)
% MONOS: 3 % (ref 0–10)
% SEGS: 88 % (ref 35–66)
ALBUMIN SERPL-MCNC: 1.9 G/DL (ref 3.4–5)
ALBUMIN/GLOB SERPL: 0.5 {RATIO} (ref 1–1.7)
ALP SERPL-CCNC: 137 U/L (ref 46–116)
ALT SERPL-CCNC: 20 U/L (ref 16–63)
ANION GAP SERPL CALC-SCNC: 14 MMOL/L (ref 6–14)
AST SERPL-CCNC: 32 U/L (ref 15–37)
BASOPHILS # BLD AUTO: 0 X10^3/UL (ref 0–0.2)
BASOPHILS NFR BLD: 0 % (ref 0–3)
BILIRUB SERPL-MCNC: 0.4 MG/DL (ref 0.2–1)
BUN SERPL-MCNC: 36 MG/DL (ref 8–26)
BUN/CREAT SERPL: 30 (ref 6–20)
CALCIUM SERPL-MCNC: 7.7 MG/DL (ref 8.5–10.1)
CHLORIDE SERPL-SCNC: 109 MMOL/L (ref 98–107)
CO2 SERPL-SCNC: 22 MMOL/L (ref 21–32)
CREAT SERPL-MCNC: 1.2 MG/DL (ref 0.7–1.3)
EOSINOPHIL NFR BLD: 0 % (ref 0–3)
EOSINOPHIL NFR BLD: 0 X10^3/UL (ref 0–0.7)
ERYTHROCYTE [DISTWIDTH] IN BLOOD BY AUTOMATED COUNT: 15.2 % (ref 11.5–14.5)
GFR SERPLBLD BASED ON 1.73 SQ M-ARVRAT: 61.1 ML/MIN
GLUCOSE SERPL-MCNC: 155 MG/DL (ref 70–99)
HCT VFR BLD CALC: 43.6 % (ref 39–53)
HGB BLD-MCNC: 14.1 G/DL (ref 13–17.5)
LYMPHOCYTES # BLD: 1 X10^3/UL (ref 1–4.8)
LYMPHOCYTES NFR BLD AUTO: 6 % (ref 24–48)
MCH RBC QN AUTO: 27 PG (ref 25–35)
MCHC RBC AUTO-ENTMCNC: 32 G/DL (ref 31–37)
MCV RBC AUTO: 84 FL (ref 79–100)
MONO #: 1.3 X10^3/UL (ref 0–1.1)
MONOCYTES NFR BLD: 7 % (ref 0–9)
NEUT #: 14.8 X10^3/UL (ref 1.8–7.7)
NEUTROPHILS NFR BLD AUTO: 87 % (ref 31–73)
PLATELET # BLD AUTO: 293 X10^3/UL (ref 140–400)
PLATELET # BLD EST: ADEQUATE 10*3/UL
POTASSIUM SERPL-SCNC: 3.9 MMOL/L (ref 3.5–5.1)
PROT SERPL-MCNC: 5.6 G/DL (ref 6.4–8.2)
RBC # BLD AUTO: 5.21 X10^6/UL (ref 4.3–5.7)
SODIUM SERPL-SCNC: 145 MMOL/L (ref 136–145)
WBC # BLD AUTO: 17.1 X10^3/UL (ref 4–11)

## 2022-01-03 RX ADMIN — BENZONATATE SCH MG: 100 CAPSULE, LIQUID FILLED ORAL at 08:27

## 2022-01-03 RX ADMIN — INSULIN LISPRO SCH UNITS: 100 INJECTION, SOLUTION INTRAVENOUS; SUBCUTANEOUS at 12:33

## 2022-01-03 RX ADMIN — ASPIRIN 81 MG SCH MG: 81 TABLET ORAL at 08:27

## 2022-01-03 RX ADMIN — Medication SCH CAP: at 21:42

## 2022-01-03 RX ADMIN — DOXYCYCLINE HYCLATE SCH MG: 100 TABLET, COATED ORAL at 21:42

## 2022-01-03 RX ADMIN — GABAPENTIN SCH MG: 400 CAPSULE ORAL at 21:42

## 2022-01-03 RX ADMIN — BENZONATATE SCH MG: 100 CAPSULE, LIQUID FILLED ORAL at 21:42

## 2022-01-03 RX ADMIN — INSULIN GLARGINE SCH UNIT: 100 INJECTION, SOLUTION SUBCUTANEOUS at 21:48

## 2022-01-03 RX ADMIN — INSULIN GLARGINE SCH UNIT: 100 INJECTION, SOLUTION SUBCUTANEOUS at 10:20

## 2022-01-03 RX ADMIN — GABAPENTIN SCH MG: 400 CAPSULE ORAL at 06:11

## 2022-01-03 RX ADMIN — REMDESIVIR SCH MLS/HR: 100 INJECTION, POWDER, LYOPHILIZED, FOR SOLUTION INTRAVENOUS at 10:23

## 2022-01-03 RX ADMIN — CLOPIDOGREL BISULFATE SCH MG: 75 TABLET ORAL at 08:27

## 2022-01-03 RX ADMIN — BENZONATATE SCH MG: 100 CAPSULE, LIQUID FILLED ORAL at 14:55

## 2022-01-03 RX ADMIN — GABAPENTIN SCH MG: 400 CAPSULE ORAL at 14:55

## 2022-01-03 RX ADMIN — INSULIN LISPRO SCH UNITS: 100 INJECTION, SOLUTION INTRAVENOUS; SUBCUTANEOUS at 17:00

## 2022-01-03 RX ADMIN — GUAIFENESIN AND CODEINE PHOSPHATE PRN ML: 10; 100 LIQUID ORAL at 09:52

## 2022-01-03 RX ADMIN — CHLORHEXIDINE GLUCONATE SCH ML: 1.2 RINSE ORAL at 08:26

## 2022-01-03 RX ADMIN — ZINC SULFATE CAP 220 MG (50 MG ELEMENTAL ZN) SCH MG: 220 (50 ZN) CAP at 08:27

## 2022-01-03 RX ADMIN — INSULIN LISPRO SCH UNITS: 100 INJECTION, SOLUTION INTRAVENOUS; SUBCUTANEOUS at 18:02

## 2022-01-03 RX ADMIN — Medication SCH MG: at 08:27

## 2022-01-03 RX ADMIN — DEXAMETHASONE SODIUM PHOSPHATE SCH MG: 4 INJECTION, SOLUTION INTRAMUSCULAR; INTRAVENOUS at 08:28

## 2022-01-03 RX ADMIN — Medication SCH CAP: at 08:27

## 2022-01-03 RX ADMIN — DOXYCYCLINE HYCLATE SCH MG: 100 TABLET, COATED ORAL at 08:27

## 2022-01-03 RX ADMIN — LISINOPRIL SCH MG: 20 TABLET ORAL at 09:00

## 2022-01-03 RX ADMIN — INSULIN LISPRO SCH UNITS: 100 INJECTION, SOLUTION INTRAVENOUS; SUBCUTANEOUS at 12:34

## 2022-01-03 RX ADMIN — INSULIN LISPRO SCH UNITS: 100 INJECTION, SOLUTION INTRAVENOUS; SUBCUTANEOUS at 10:19

## 2022-01-03 RX ADMIN — ENOXAPARIN SODIUM SCH MG: 40 INJECTION SUBCUTANEOUS at 10:24

## 2022-01-03 RX ADMIN — CHLORHEXIDINE GLUCONATE SCH ML: 1.2 RINSE ORAL at 21:42

## 2022-01-03 NOTE — PDOC
PULMONARY PROGRESS NOTES


DATE: 1/3/22 


TIME: 08:58


Subjective


Patient continues to be short of air, requires oxygen, currently on 5 L.


Vitals





Vital Signs








  Date Time  Temp Pulse Resp B/P (MAP) Pulse Ox O2 Delivery O2 Flow Rate FiO2


 


1/3/22 03:00 97.3 72 20 150/82 (104) 93 Nasal Cannula 6.0 





 97.3       








Comments


Patient seen during the COVID-19 viral pandemic, visual inspection not utilizing

accessory muscles no paroxysmal breathing pattern no rashes


Lungs:  Clear


Cardiovascular:  S1, S2


Labs





Laboratory Tests








Test


 1/1/22


12:03 1/1/22


17:32 1/1/22


20:20 1/2/22


07:52


 


Glucose (Fingerstick)


 349 mg/dL


(70-99) 297 mg/dL


(70-99) 252 mg/dL


(70-99) 293 mg/dL


(70-99)


 


Test


 1/2/22


11:10 1/2/22


16:33 1/2/22


19:32 1/3/22


06:35


 


Glucose (Fingerstick)


 293 mg/dL


(70-99) 213 mg/dL


(70-99) 199 mg/dL


(70-99) 





 


White Blood Count


 


 


 


 17.1 x10^3/uL


(4.0-11.0)


 


Red Blood Count


 


 


 


 5.21 x10^6/uL


(4.30-5.70)


 


Hemoglobin


 


 


 


 14.1 g/dL


(13.0-17.5)


 


Hematocrit


 


 


 


 43.6 %


(39.0-53.0)


 


Mean Corpuscular Volume    84 fL () 


 


Mean Corpuscular Hemoglobin    27 pg (25-35) 


 


Mean Corpuscular Hemoglobin


Concent 


 


 


 32 g/dL


(31-37)


 


Red Cell Distribution Width


 


 


 


 15.2 %


(11.5-14.5)


 


Platelet Count


 


 


 


 293 x10^3/uL


(140-400)


 


Neutrophils (%) (Auto)    87 % (31-73) 


 


Lymphocytes (%) (Auto)    6 % (24-48) 


 


Monocytes (%) (Auto)    7 % (0-9) 


 


Eosinophils (%) (Auto)    0 % (0-3) 


 


Basophils (%) (Auto)    0 % (0-3) 


 


Neutrophils # (Auto)


 


 


 


 14.8 x10^3/uL


(1.8-7.7)


 


Lymphocytes # (Auto)


 


 


 


 1.0 x10^3/uL


(1.0-4.8)


 


Monocytes # (Auto)


 


 


 


 1.3 x10^3/uL


(0.0-1.1)


 


Eosinophils # (Auto)


 


 


 


 0.0 x10^3/uL


(0.0-0.7)


 


Basophils # (Auto)


 


 


 


 0.0 x10^3/uL


(0.0-0.2)


 


Sodium Level


 


 


 


 145 mmol/L


(136-145)


 


Potassium Level


 


 


 


 3.9 mmol/L


(3.5-5.1)


 


Chloride Level


 


 


 


 109 mmol/L


()


 


Carbon Dioxide Level


 


 


 


 22 mmol/L


(21-32)


 


Anion Gap    14 (6-14) 


 


Blood Urea Nitrogen


 


 


 


 36 mg/dL


(8-26)


 


Creatinine


 


 


 


 1.2 mg/dL


(0.7-1.3)


 


Estimated GFR


(Cockcroft-Gault) 


 


 


 61.1 





 


BUN/Creatinine Ratio    30 (6-20) 


 


Glucose Level


 


 


 


 155 mg/dL


(70-99)


 


Calcium Level


 


 


 


 7.7 mg/dL


(8.5-10.1)


 


Total Bilirubin


 


 


 


 0.4 mg/dL


(0.2-1.0)


 


Aspartate Amino Transf


(AST/SGOT) 


 


 


 32 U/L (15-37) 





 


Alanine Aminotransferase


(ALT/SGPT) 


 


 


 20 U/L (16-63) 





 


Alkaline Phosphatase


 


 


 


 137 U/L


()


 


Total Protein


 


 


 


 5.6 g/dL


(6.4-8.2)


 


Albumin


 


 


 


 1.9 g/dL


(3.4-5.0)


 


Albumin/Globulin Ratio    0.5 (1.0-1.7) 








Laboratory Tests








Test


 1/2/22


11:10 1/2/22


16:33 1/2/22


19:32 1/3/22


06:35


 


Glucose (Fingerstick)


 293 mg/dL


(70-99) 213 mg/dL


(70-99) 199 mg/dL


(70-99) 





 


White Blood Count


 


 


 


 17.1 x10^3/uL


(4.0-11.0)


 


Red Blood Count


 


 


 


 5.21 x10^6/uL


(4.30-5.70)


 


Hemoglobin


 


 


 


 14.1 g/dL


(13.0-17.5)


 


Hematocrit


 


 


 


 43.6 %


(39.0-53.0)


 


Mean Corpuscular Volume    84 fL () 


 


Mean Corpuscular Hemoglobin    27 pg (25-35) 


 


Mean Corpuscular Hemoglobin


Concent 


 


 


 32 g/dL


(31-37)


 


Red Cell Distribution Width


 


 


 


 15.2 %


(11.5-14.5)


 


Platelet Count


 


 


 


 293 x10^3/uL


(140-400)


 


Neutrophils (%) (Auto)    87 % (31-73) 


 


Lymphocytes (%) (Auto)    6 % (24-48) 


 


Monocytes (%) (Auto)    7 % (0-9) 


 


Eosinophils (%) (Auto)    0 % (0-3) 


 


Basophils (%) (Auto)    0 % (0-3) 


 


Neutrophils # (Auto)


 


 


 


 14.8 x10^3/uL


(1.8-7.7)


 


Lymphocytes # (Auto)


 


 


 


 1.0 x10^3/uL


(1.0-4.8)


 


Monocytes # (Auto)


 


 


 


 1.3 x10^3/uL


(0.0-1.1)


 


Eosinophils # (Auto)


 


 


 


 0.0 x10^3/uL


(0.0-0.7)


 


Basophils # (Auto)


 


 


 


 0.0 x10^3/uL


(0.0-0.2)


 


Sodium Level


 


 


 


 145 mmol/L


(136-145)


 


Potassium Level


 


 


 


 3.9 mmol/L


(3.5-5.1)


 


Chloride Level


 


 


 


 109 mmol/L


()


 


Carbon Dioxide Level


 


 


 


 22 mmol/L


(21-32)


 


Anion Gap    14 (6-14) 


 


Blood Urea Nitrogen


 


 


 


 36 mg/dL


(8-26)


 


Creatinine


 


 


 


 1.2 mg/dL


(0.7-1.3)


 


Estimated GFR


(Cockcroft-Gault) 


 


 


 61.1 





 


BUN/Creatinine Ratio    30 (6-20) 


 


Glucose Level


 


 


 


 155 mg/dL


(70-99)


 


Calcium Level


 


 


 


 7.7 mg/dL


(8.5-10.1)


 


Total Bilirubin


 


 


 


 0.4 mg/dL


(0.2-1.0)


 


Aspartate Amino Transf


(AST/SGOT) 


 


 


 32 U/L (15-37) 





 


Alanine Aminotransferase


(ALT/SGPT) 


 


 


 20 U/L (16-63) 





 


Alkaline Phosphatase


 


 


 


 137 U/L


()


 


Total Protein


 


 


 


 5.6 g/dL


(6.4-8.2)


 


Albumin


 


 


 


 1.9 g/dL


(3.4-5.0)


 


Albumin/Globulin Ratio    0.5 (1.0-1.7) 








Medications





Active Scripts








 Medications  Dose


 Route/Sig


 Max Daily Dose Days Date Category Dose


Instructions


 


 Azithromycin


 Tablet


  (Azithromycin)


 250 Mg Tablet  1 Pkg


 PO UD


  5 12/29/21 Rx  2 the first day followed by 1 for days 2-5


 


 Hydrocodone-Apap


 5-325  **


  (Hydrocodone


 Bit/Acetaminophen)


 1 Tab Tablet  1 Tab


 PO PRN Q6HRS PRN


  3 5/28/21 Rx 


 


 Keflex


  (Cephalexin) 500


 Mg Capsule  1 Cap


 PO TID


  10 6/28/20 Rx 


 


 Norco 5-325


 Tablet


  (Acetaminophen/Hydrocodone


 Bitart) 1 Each


 Tablet  1 Tab


 PO PRN Q6HRS PRN


   4/6/20 Rx 


 


 Keflex


  (Cephalexin) 500


 Mg Capsule  1 Cap


 PO TID


  7 12/4/19 Rx 


 


 Peridex


  (Chlorhexidine


 Gluconate) 15 Ml


 Mouthwash  15 Ml


 SWSP BID


  7 12/4/19 Rx  Brush teeth before using to prevent staining. Swish for


 approximately 30 seconds before spitting.


 


 Naproxen 500 Mg


 Tablet  1 Tab


 PO BID PRN


  10 12/4/19 Rx 


 


 Clopidogrel


  (Clopidogrel


 Bisulfate) 75 Mg


 Tablet  75 Mg


 PO DAILYWBKFT


  30 6/24/19 Rx 


 


 Viagra


  (Sildenafil


 Citrate) 100 Mg


 Tablet  1 Tab


 PO PRN DAILY


   6/24/19 Reported 


 


 Nystatin 15 Gm


 Powder  1 Qasim


 TP BID


   6/24/19 Reported 


 


 Mupirocin


 Ointment


  (Mupirocin) 22 Gm


 Oint...g.  1 Qasim


 TP DAILY


   6/24/19 Reported 


 


 Lisinopril 20 Mg


 Tablet  1 Tab


 PO DAILY


   6/24/19 Reported 


 


 Gabapentin 800 Mg


 Tablet  800 Mg


 PO Q8HRS


   6/24/19 Reported 


 


 Atorvastatin


 Calcium 80 Mg


 Tablet  1 Tab


 PO DAILY


   6/24/19 Reported 


 


 Janumet 50-1,000


 Mg Tablet


  (Sitagliptin


 Phos/Metformin


 Hcl) 1 Each Tablet  1 Tab


 PO BID


   8/30/18 Reported 


 


 Pentoxifylline


 400 Mg Tablet.er  400 Mg


 PO TID


   8/30/18 Reported 


 


 Cymbalta


  (Duloxetine Hcl)


 30 Mg Capsule.dr  30 Mg


 PO QHS


   8/30/18 Reported 


 


 Glipizide 10 Mg


 Tablet  1 Tab


 PO BID


   8/30/18 Reported 


 


 Aspirin 81 Mg


 Tab.chew  1 Tab


 PO DAILY


   10/27/15 Reported 











Impression


.


IMPRESSION:


1.  Acute exacerbation of chronic obstructive pulmonary disease secondary to 


COVID-19 viral pneumonia.


2.  COVID-19 viral pneumonia, acute respiratory distress syndrome.


3.  Coronary artery disease, status post coronary artery bypass grafting.


4.  Diabetes.





Plan


.


Updated 1/3


Continue oxygen supplementation


Finish course of remdesivir


Dexamethasone


Empiric antibiotics


DVT prophylaxis


Monitor blood sugars








PLAN:


1.  Continue support with oxygen supplementation.


2.  Dexamethasone.


3.  Finish course of remdesivir.


4.  Discontinue Rocephin.  Continue doxycycline.


5.  DVT prophylaxis.


6.  Monitor blood sugars.





I do appreciate the privilege in sharing in the patient's care.











RAMBO ESPINOZA MD               Thierry 3, 2022 08:58

## 2022-01-03 NOTE — NUR
SW following. Discussed with RN, pt from home with wife, 6L (does not use oxygen at home), 
cardiac diet, independent, COVID-19 positive. Pulmonology following. RN advised no SW needs 
at this time. SW will continue to follow.

## 2022-01-03 NOTE — PDOC
[employer] [facility] PROGRESS NOTE


Date of Service


DOS:


DATE: 1/3/22 


TIME: 11:27





Chief Complaint


Chief Complaint


acute hypoxic respiratory failure


COVID+ pneumonia


DM2, acute hyperglycemia


acute weakness


diarrhea





History of Present Illness


History of Present Illness


01/03/2022


Pt seen and examined 


Discussed with RN


Chart reviewed


Continue COVID protocol with decadron, remdesivir, Vit C and Zinc





increase the lantus and SSI and meal


add percocet and Robitsussin with codeine


he looks worse, weak,  can barely eat,  poor po intake,  on IV fluids, 


blood sugar high,  may transtion to PPN if that improved,





Vitals/I&O


Vitals/I&O:





                                   Vital Signs








  Date Time  Temp Pulse Resp B/P (MAP) Pulse Ox O2 Delivery O2 Flow Rate FiO2


 


1/3/22 11:00 97.3 80 20 158/77 (104) 85 Nasal Cannula 6.0 





 97.3       














                                    I & O   


 


 1/2/22 1/2/22 1/3/22





 15:00 23:00 07:00


 


Intake Total  120 ml 


 


Output Total 700 ml  400 ml


 


Balance -700 ml 120 ml -400 ml











Physical Exam


General:  Alert, Cooperative, moderate distress


Lungs:  Clear


Abdomen:  Soft


Extremities:  No clubbing, No edema


Skin:  No rashes, No significant lesion





Labs


Labs:





Laboratory Tests








Test


 1/2/22


16:33 1/2/22


19:32 1/3/22


06:35 1/3/22


10:01


 


Glucose (Fingerstick)


 213 mg/dL


(70-99) 199 mg/dL


(70-99) 


 188 mg/dL


(70-99)


 


White Blood Count


 


 


 17.1 x10^3/uL


(4.0-11.0) 





 


Red Blood Count


 


 


 5.21 x10^6/uL


(4.30-5.70) 





 


Hemoglobin


 


 


 14.1 g/dL


(13.0-17.5) 





 


Hematocrit


 


 


 43.6 %


(39.0-53.0) 





 


Mean Corpuscular Volume   84 fL ()  


 


Mean Corpuscular Hemoglobin   27 pg (25-35)  


 


Mean Corpuscular Hemoglobin


Concent 


 


 32 g/dL


(31-37) 





 


Red Cell Distribution Width


 


 


 15.2 %


(11.5-14.5) 





 


Platelet Count


 


 


 293 x10^3/uL


(140-400) 





 


Neutrophils (%) (Auto)   87 % (31-73)  


 


Lymphocytes (%) (Auto)   6 % (24-48)  


 


Monocytes (%) (Auto)   7 % (0-9)  


 


Eosinophils (%) (Auto)   0 % (0-3)  


 


Basophils (%) (Auto)   0 % (0-3)  


 


Neutrophils # (Auto)


 


 


 14.8 x10^3/uL


(1.8-7.7) 





 


Lymphocytes # (Auto)


 


 


 1.0 x10^3/uL


(1.0-4.8) 





 


Monocytes # (Auto)


 


 


 1.3 x10^3/uL


(0.0-1.1) 





 


Eosinophils # (Auto)


 


 


 0.0 x10^3/uL


(0.0-0.7) 





 


Basophils # (Auto)


 


 


 0.0 x10^3/uL


(0.0-0.2) 





 


Segmented Neutrophils %   88 % (35-66)  


 


Band Neutrophils %   2 % (0-9)  


 


Lymphocytes %   6 % (24-48)  


 


Atypical Lymphocytes %


(Manual) 


 


 1 % (0-0) 


 





 


Monocytes %   3 % (0-10)  


 


Platelet Estimate


 


 


 Adequate


(ADEQUATE) 





 


Large Platelets   Few  


 


Sodium Level


 


 


 145 mmol/L


(136-145) 





 


Potassium Level


 


 


 3.9 mmol/L


(3.5-5.1) 





 


Chloride Level


 


 


 109 mmol/L


() 





 


Carbon Dioxide Level


 


 


 22 mmol/L


(21-32) 





 


Anion Gap   14 (6-14)  


 


Blood Urea Nitrogen


 


 


 36 mg/dL


(8-26) 





 


Creatinine


 


 


 1.2 mg/dL


(0.7-1.3) 





 


Estimated GFR


(Cockcroft-Gault) 


 


 61.1 


 





 


BUN/Creatinine Ratio   30 (6-20)  


 


Glucose Level


 


 


 155 mg/dL


(70-99) 





 


Calcium Level


 


 


 7.7 mg/dL


(8.5-10.1) 





 


Total Bilirubin


 


 


 0.4 mg/dL


(0.2-1.0) 





 


Aspartate Amino Transf


(AST/SGOT) 


 


 32 U/L (15-37) 


 





 


Alanine Aminotransferase


(ALT/SGPT) 


 


 20 U/L (16-63) 


 





 


Alkaline Phosphatase


 


 


 137 U/L


() 





 


Total Protein


 


 


 5.6 g/dL


(6.4-8.2) 





 


Albumin


 


 


 1.9 g/dL


(3.4-5.0) 





 


Albumin/Globulin Ratio   0.5 (1.0-1.7)  


 


Test


 1/3/22


11:23 


 


 





 


Glucose (Fingerstick)


 205 mg/dL


(70-99) 


 


 














Assessment and Plan


Assessmemt and Plan


Problems


Medical Problems:


(1) COVID-19


Status: Acute  





(2) Pneumonia


Status: Acute  





(3) Respiratory distress


Status: Acute  








acute hypoxic respiratory failure


COVID+ pneumonia


DM2, acute hyperglycemia


acute weakness


diarrhea





Plan: 


Continue COVID protocol with decadron + remdesivir 


Continue Vitamin C and Zinc 


Supplemental O2 as needed, wean as tolerated 


Continue robutussin and codeine





Comment


Review of Relevant


I have reviewed the following items zainab (where applicable) has been applied.


Medications:





Current Medications








 Medications


  (Trade)  Dose


 Ordered  Sig/Makenzie


 Route


 PRN Reason  Start Time


 Stop Time Status Last Admin


Dose Admin


 


 Insulin Glargine


  (Lantus Syringe)  30 unit  BID


 SQ


   1/2/22 21:00


    1/3/22 10:20





 


 Insulin Human


 Lispro


  (HumaLOG)  20 units  TIDAC


 SQ


   1/2/22 16:30


    1/3/22 10:19





 


 Oxycodone/


 Acetaminophen


  (Percocet 5/325)  1 tab  PRN Q4HRS  PRN


 PO


 PAIN, 2nd CHOICE  1/2/22 14:00


    1/2/22 18:02





 


 Guaifenesin/


 Codeine Phosphate


  (Robitussin Ac)  5 ml  PRN Q6HRS  PRN


 PO


 COUGH  1/2/22 14:00


    1/3/22 09:52














Justifications for Admission


Other Justification














HEDY BLANCHARD III DO            Thierry 3, 2022 11:30

## 2022-01-04 VITALS — DIASTOLIC BLOOD PRESSURE: 78 MMHG | SYSTOLIC BLOOD PRESSURE: 161 MMHG

## 2022-01-04 VITALS — DIASTOLIC BLOOD PRESSURE: 76 MMHG | SYSTOLIC BLOOD PRESSURE: 156 MMHG

## 2022-01-04 VITALS — DIASTOLIC BLOOD PRESSURE: 77 MMHG | SYSTOLIC BLOOD PRESSURE: 134 MMHG

## 2022-01-04 VITALS — SYSTOLIC BLOOD PRESSURE: 153 MMHG | DIASTOLIC BLOOD PRESSURE: 78 MMHG

## 2022-01-04 VITALS — DIASTOLIC BLOOD PRESSURE: 60 MMHG | SYSTOLIC BLOOD PRESSURE: 141 MMHG

## 2022-01-04 RX ADMIN — GABAPENTIN SCH MG: 400 CAPSULE ORAL at 21:34

## 2022-01-04 RX ADMIN — Medication SCH MG: at 10:19

## 2022-01-04 RX ADMIN — OXYCODONE HYDROCHLORIDE AND ACETAMINOPHEN PRN TAB: 5; 325 TABLET ORAL at 10:18

## 2022-01-04 RX ADMIN — OXYCODONE HYDROCHLORIDE AND ACETAMINOPHEN PRN TAB: 5; 325 TABLET ORAL at 14:57

## 2022-01-04 RX ADMIN — ENOXAPARIN SODIUM SCH MG: 40 INJECTION SUBCUTANEOUS at 12:42

## 2022-01-04 RX ADMIN — ZINC SULFATE CAP 220 MG (50 MG ELEMENTAL ZN) SCH MG: 220 (50 ZN) CAP at 10:08

## 2022-01-04 RX ADMIN — GABAPENTIN SCH MG: 400 CAPSULE ORAL at 14:57

## 2022-01-04 RX ADMIN — REMDESIVIR SCH MLS/HR: 100 INJECTION, POWDER, LYOPHILIZED, FOR SOLUTION INTRAVENOUS at 12:42

## 2022-01-04 RX ADMIN — BENZONATATE SCH MG: 100 CAPSULE, LIQUID FILLED ORAL at 10:18

## 2022-01-04 RX ADMIN — DOXYCYCLINE HYCLATE SCH MG: 100 TABLET, COATED ORAL at 10:19

## 2022-01-04 RX ADMIN — INSULIN LISPRO SCH UNITS: 100 INJECTION, SOLUTION INTRAVENOUS; SUBCUTANEOUS at 12:00

## 2022-01-04 RX ADMIN — BENZONATATE SCH MG: 100 CAPSULE, LIQUID FILLED ORAL at 14:57

## 2022-01-04 RX ADMIN — CHLORHEXIDINE GLUCONATE SCH ML: 1.2 RINSE ORAL at 21:34

## 2022-01-04 RX ADMIN — INSULIN LISPRO SCH UNITS: 100 INJECTION, SOLUTION INTRAVENOUS; SUBCUTANEOUS at 07:30

## 2022-01-04 RX ADMIN — DEXAMETHASONE SODIUM PHOSPHATE SCH MG: 4 INJECTION, SOLUTION INTRAMUSCULAR; INTRAVENOUS at 10:20

## 2022-01-04 RX ADMIN — CHLORHEXIDINE GLUCONATE SCH ML: 1.2 RINSE ORAL at 10:08

## 2022-01-04 RX ADMIN — LISINOPRIL SCH MG: 20 TABLET ORAL at 10:09

## 2022-01-04 RX ADMIN — BENZONATATE SCH MG: 100 CAPSULE, LIQUID FILLED ORAL at 21:34

## 2022-01-04 RX ADMIN — Medication SCH CAP: at 21:34

## 2022-01-04 RX ADMIN — CLOPIDOGREL BISULFATE SCH MG: 75 TABLET ORAL at 10:19

## 2022-01-04 RX ADMIN — INSULIN LISPRO SCH UNITS: 100 INJECTION, SOLUTION INTRAVENOUS; SUBCUTANEOUS at 11:30

## 2022-01-04 RX ADMIN — Medication SCH CAP: at 10:18

## 2022-01-04 RX ADMIN — INSULIN GLARGINE SCH UNIT: 100 INJECTION, SOLUTION SUBCUTANEOUS at 10:15

## 2022-01-04 RX ADMIN — INSULIN LISPRO SCH UNITS: 100 INJECTION, SOLUTION INTRAVENOUS; SUBCUTANEOUS at 18:02

## 2022-01-04 RX ADMIN — INSULIN GLARGINE SCH UNIT: 100 INJECTION, SOLUTION SUBCUTANEOUS at 21:51

## 2022-01-04 RX ADMIN — INSULIN LISPRO SCH UNITS: 100 INJECTION, SOLUTION INTRAVENOUS; SUBCUTANEOUS at 08:00

## 2022-01-04 RX ADMIN — GUAIFENESIN AND CODEINE PHOSPHATE PRN ML: 10; 100 LIQUID ORAL at 10:18

## 2022-01-04 RX ADMIN — ASPIRIN 81 MG SCH MG: 81 TABLET ORAL at 10:08

## 2022-01-04 RX ADMIN — OXYCODONE HYDROCHLORIDE AND ACETAMINOPHEN PRN TAB: 5; 325 TABLET ORAL at 19:35

## 2022-01-04 RX ADMIN — GABAPENTIN SCH MG: 400 CAPSULE ORAL at 06:15

## 2022-01-04 RX ADMIN — DOXYCYCLINE HYCLATE SCH MG: 100 TABLET, COATED ORAL at 21:34

## 2022-01-04 NOTE — PDOC
[employer] [facility] PROGRESS NOTE


Date of Service


DOS:


DATE: 1/4/22 


TIME: 10:59





Chief Complaint


Chief Complaint


acute hypoxic respiratory failure


COVID+ pneumonia


DM2, acute hyperglycemia


acute weakness


diarrhea





History of Present Illness


History of Present Illness


01/04/2022


Pt seen and examined 


Discussed with RN


Chart reviewed 


Pt resting in bed, NAD 





01/03/2022


Pt seen and examined 


Discussed with RN


Chart reviewed


Continue COVID protocol with decadron, remdesivir, Vit C and Zinc





increase the lantus and SSI and meal


add percocet and Robitsussin with codeine


he looks worse, weak,  can barely eat,  poor po intake,  on IV fluids, 


blood sugar high,  may transtion to PPN if that improved,





Vitals/I&O


Vitals/I&O:





                                   Vital Signs








  Date Time  Temp Pulse Resp B/P (MAP) Pulse Ox O2 Delivery O2 Flow Rate FiO2


 


1/4/22 10:57     90 Nasal Cannula 6.0 


 


1/4/22 10:09  78  134/77    


 


1/4/22 07:00 97.4       





 97.4       


 


1/3/22 23:36   24     














                                    I & O   


 


 1/3/22 1/3/22 1/4/22





 15:00 23:00 07:00


 


Intake Total 420 ml 240 ml 100 ml


 


Output Total  100 ml 700 ml


 


Balance 420 ml 140 ml -600 ml











Physical Exam


General:  Alert, Cooperative, moderate distress


Lungs:  Clear


Abdomen:  Soft


Extremities:  No clubbing, No edema


Skin:  No rashes, No significant lesion





Labs


Labs:





Laboratory Tests








Test


 1/3/22


11:23 1/3/22


16:39 1/3/22


20:42 1/4/22


07:23


 


Glucose (Fingerstick)


 205 mg/dL


(70-99) 144 mg/dL


(70-99) 114 mg/dL


(70-99) 85 mg/dL


(70-99)











Assessment and Plan


Assessmemt and Plan


Problems


Medical Problems:


(1) COVID-19


Status: Acute  





(2) Pneumonia


Status: Acute  





(3) Respiratory distress


Status: Acute  





acute hypoxic respiratory failure


COVID+ pneumonia


DM2, acute hyperglycemia


acute weakness


diarrhea





Plan: 


Continue COVID protocol with decadron + remdesivir 


Continue Vitamin C and Zinc 


Supplemental O2 as needed, wean as tolerated 


Continue robutussin and codeine





Comment


Review of Relevant


I have reviewed the following items zainab (where applicable) has been applied.





Justifications for Admission


Other Justification














HEDY BLANCHARD III DO            Jan 4, 2022 11:00

## 2022-01-04 NOTE — PDOC
PULMONARY PROGRESS NOTES


DATE: 1/4/22 


TIME: 11:00


Subjective


Patient continues to be short of air, requires oxygen, currently on 5 L.


Vitals





Vital Signs








  Date Time  Temp Pulse Resp B/P (MAP) Pulse Ox O2 Delivery O2 Flow Rate FiO2


 


1/4/22 10:57     90 Nasal Cannula 6.0 


 


1/4/22 10:09  78  134/77    


 


1/4/22 07:00 97.4       





 97.4       


 


1/3/22 23:36   24     








Comments


Patient seen during the COVID-19 viral pandemic, visual inspection not utilizing

accessory muscles no paroxysmal breathing pattern no rashes


Labs





Laboratory Tests








Test


 1/2/22


11:10 1/2/22


16:33 1/2/22


19:32 1/3/22


06:35


 


Glucose (Fingerstick)


 293 mg/dL


(70-99) 213 mg/dL


(70-99) 199 mg/dL


(70-99) 





 


White Blood Count


 


 


 


 17.1 x10^3/uL


(4.0-11.0)


 


Red Blood Count


 


 


 


 5.21 x10^6/uL


(4.30-5.70)


 


Hemoglobin


 


 


 


 14.1 g/dL


(13.0-17.5)


 


Hematocrit


 


 


 


 43.6 %


(39.0-53.0)


 


Mean Corpuscular Volume    84 fL () 


 


Mean Corpuscular Hemoglobin    27 pg (25-35) 


 


Mean Corpuscular Hemoglobin


Concent 


 


 


 32 g/dL


(31-37)


 


Red Cell Distribution Width


 


 


 


 15.2 %


(11.5-14.5)


 


Platelet Count


 


 


 


 293 x10^3/uL


(140-400)


 


Neutrophils (%) (Auto)    87 % (31-73) 


 


Lymphocytes (%) (Auto)    6 % (24-48) 


 


Monocytes (%) (Auto)    7 % (0-9) 


 


Eosinophils (%) (Auto)    0 % (0-3) 


 


Basophils (%) (Auto)    0 % (0-3) 


 


Neutrophils # (Auto)


 


 


 


 14.8 x10^3/uL


(1.8-7.7)


 


Lymphocytes # (Auto)


 


 


 


 1.0 x10^3/uL


(1.0-4.8)


 


Monocytes # (Auto)


 


 


 


 1.3 x10^3/uL


(0.0-1.1)


 


Eosinophils # (Auto)


 


 


 


 0.0 x10^3/uL


(0.0-0.7)


 


Basophils # (Auto)


 


 


 


 0.0 x10^3/uL


(0.0-0.2)


 


Segmented Neutrophils %    88 % (35-66) 


 


Band Neutrophils %    2 % (0-9) 


 


Lymphocytes %    6 % (24-48) 


 


Atypical Lymphocytes %


(Manual) 


 


 


 1 % (0-0) 





 


Monocytes %    3 % (0-10) 


 


Platelet Estimate


 


 


 


 Adequate


(ADEQUATE)


 


Large Platelets    Few 


 


Sodium Level


 


 


 


 145 mmol/L


(136-145)


 


Potassium Level


 


 


 


 3.9 mmol/L


(3.5-5.1)


 


Chloride Level


 


 


 


 109 mmol/L


()


 


Carbon Dioxide Level


 


 


 


 22 mmol/L


(21-32)


 


Anion Gap    14 (6-14) 


 


Blood Urea Nitrogen


 


 


 


 36 mg/dL


(8-26)


 


Creatinine


 


 


 


 1.2 mg/dL


(0.7-1.3)


 


Estimated GFR


(Cockcroft-Gault) 


 


 


 61.1 





 


BUN/Creatinine Ratio    30 (6-20) 


 


Glucose Level


 


 


 


 155 mg/dL


(70-99)


 


Calcium Level


 


 


 


 7.7 mg/dL


(8.5-10.1)


 


Total Bilirubin


 


 


 


 0.4 mg/dL


(0.2-1.0)


 


Aspartate Amino Transf


(AST/SGOT) 


 


 


 32 U/L (15-37) 





 


Alanine Aminotransferase


(ALT/SGPT) 


 


 


 20 U/L (16-63) 





 


Alkaline Phosphatase


 


 


 


 137 U/L


()


 


Total Protein


 


 


 


 5.6 g/dL


(6.4-8.2)


 


Albumin


 


 


 


 1.9 g/dL


(3.4-5.0)


 


Albumin/Globulin Ratio    0.5 (1.0-1.7) 


 


Test


 1/3/22


10:01 1/3/22


11:23 1/3/22


16:39 1/3/22


20:42


 


Glucose (Fingerstick)


 188 mg/dL


(70-99) 205 mg/dL


(70-99) 144 mg/dL


(70-99) 114 mg/dL


(70-99)


 


Test


 1/4/22


07:23 


 


 





 


Glucose (Fingerstick)


 85 mg/dL


(70-99) 


 


 











Laboratory Tests








Test


 1/3/22


11:23 1/3/22


16:39 1/3/22


20:42 1/4/22


07:23


 


Glucose (Fingerstick)


 205 mg/dL


(70-99) 144 mg/dL


(70-99) 114 mg/dL


(70-99) 85 mg/dL


(70-99)








Medications





Active Scripts








 Medications  Dose


 Route/Sig


 Max Daily Dose Days Date Category Dose


Instructions


 


 Azithromycin


 Tablet


  (Azithromycin)


 250 Mg Tablet  1 Pkg


 PO UD


  5 12/29/21 Rx  2 the first day followed by 1 for days 2-5


 


 Hydrocodone-Apap


 5-325  **


  (Hydrocodone


 Bit/Acetaminophen)


 1 Tab Tablet  1 Tab


 PO PRN Q6HRS PRN


  3 5/28/21 Rx 


 


 Keflex


  (Cephalexin) 500


 Mg Capsule  1 Cap


 PO TID


  10 6/28/20 Rx 


 


 Norco 5-325


 Tablet


  (Acetaminophen/Hydrocodone


 Bitart) 1 Each


 Tablet  1 Tab


 PO PRN Q6HRS PRN


   4/6/20 Rx 


 


 Keflex


  (Cephalexin) 500


 Mg Capsule  1 Cap


 PO TID


  7 12/4/19 Rx 


 


 Peridex


  (Chlorhexidine


 Gluconate) 15 Ml


 Mouthwash  15 Ml


 SWSP BID


  7 12/4/19 Rx  Brush teeth before using to prevent staining. Swish for


 approximately 30 seconds before spitting.


 


 Naproxen 500 Mg


 Tablet  1 Tab


 PO BID PRN


  10 12/4/19 Rx 


 


 Clopidogrel


  (Clopidogrel


 Bisulfate) 75 Mg


 Tablet  75 Mg


 PO DAILYWBKFT


  30 6/24/19 Rx 


 


 Viagra


  (Sildenafil


 Citrate) 100 Mg


 Tablet  1 Tab


 PO PRN DAILY


   6/24/19 Reported 


 


 Nystatin 15 Gm


 Powder  1 Qasim


 TP BID


   6/24/19 Reported 


 


 Mupirocin


 Ointment


  (Mupirocin) 22 Gm


 Oint...g.  1 Qasim


 TP DAILY


   6/24/19 Reported 


 


 Lisinopril 20 Mg


 Tablet  1 Tab


 PO DAILY


   6/24/19 Reported 


 


 Gabapentin 800 Mg


 Tablet  800 Mg


 PO Q8HRS


   6/24/19 Reported 


 


 Atorvastatin


 Calcium 80 Mg


 Tablet  1 Tab


 PO DAILY


   6/24/19 Reported 


 


 Janumet 50-1,000


 Mg Tablet


  (Sitagliptin


 Phos/Metformin


 Hcl) 1 Each Tablet  1 Tab


 PO BID


   8/30/18 Reported 


 


 Pentoxifylline


 400 Mg Tablet.er  400 Mg


 PO TID


   8/30/18 Reported 


 


 Cymbalta


  (Duloxetine Hcl)


 30 Mg Capsule.dr  30 Mg


 PO QHS


   8/30/18 Reported 


 


 Glipizide 10 Mg


 Tablet  1 Tab


 PO BID


   8/30/18 Reported 


 


 Aspirin 81 Mg


 Tab.chew  1 Tab


 PO DAILY


   10/27/15 Reported 











Impression


.


IMPRESSION:


1.  Acute exacerbation of chronic obstructive pulmonary disease secondary to 


COVID-19 viral pneumonia.


2.  COVID-19 viral pneumonia, acute respiratory distress syndrome.


3.  Coronary artery disease, status post coronary artery bypass grafting.


4.  Diabetes.





Plan


.





Continue oxygen supplementation FiO2 to keep saturations 92% and above.


Finish course of remdesivir


Dexamethasone for total of 10 days.


Empiric antibiotics


DVT prophylaxis


Monitor blood sugars


Clinically stable.


We will see him as needed.











SANJU NEGRO MD                  Jan 4, 2022 11:01

## 2022-01-05 VITALS — SYSTOLIC BLOOD PRESSURE: 161 MMHG | DIASTOLIC BLOOD PRESSURE: 83 MMHG

## 2022-01-05 VITALS — SYSTOLIC BLOOD PRESSURE: 121 MMHG | DIASTOLIC BLOOD PRESSURE: 64 MMHG

## 2022-01-05 VITALS — SYSTOLIC BLOOD PRESSURE: 157 MMHG | DIASTOLIC BLOOD PRESSURE: 80 MMHG

## 2022-01-05 VITALS — SYSTOLIC BLOOD PRESSURE: 139 MMHG | DIASTOLIC BLOOD PRESSURE: 71 MMHG

## 2022-01-05 VITALS — DIASTOLIC BLOOD PRESSURE: 91 MMHG | SYSTOLIC BLOOD PRESSURE: 183 MMHG

## 2022-01-05 VITALS — SYSTOLIC BLOOD PRESSURE: 179 MMHG | DIASTOLIC BLOOD PRESSURE: 86 MMHG

## 2022-01-05 RX ADMIN — GUAIFENESIN AND CODEINE PHOSPHATE PRN ML: 10; 100 LIQUID ORAL at 22:30

## 2022-01-05 RX ADMIN — Medication SCH CAP: at 22:29

## 2022-01-05 RX ADMIN — ASPIRIN 81 MG SCH MG: 81 TABLET ORAL at 09:55

## 2022-01-05 RX ADMIN — INSULIN LISPRO SCH UNITS: 100 INJECTION, SOLUTION INTRAVENOUS; SUBCUTANEOUS at 11:30

## 2022-01-05 RX ADMIN — INSULIN GLARGINE SCH UNIT: 100 INJECTION, SOLUTION SUBCUTANEOUS at 22:30

## 2022-01-05 RX ADMIN — INSULIN LISPRO SCH UNITS: 100 INJECTION, SOLUTION INTRAVENOUS; SUBCUTANEOUS at 08:00

## 2022-01-05 RX ADMIN — ZINC SULFATE CAP 220 MG (50 MG ELEMENTAL ZN) SCH MG: 220 (50 ZN) CAP at 09:55

## 2022-01-05 RX ADMIN — GABAPENTIN SCH MG: 400 CAPSULE ORAL at 14:01

## 2022-01-05 RX ADMIN — INSULIN LISPRO SCH UNITS: 100 INJECTION, SOLUTION INTRAVENOUS; SUBCUTANEOUS at 17:56

## 2022-01-05 RX ADMIN — ENOXAPARIN SODIUM SCH MG: 40 INJECTION SUBCUTANEOUS at 14:01

## 2022-01-05 RX ADMIN — CLOPIDOGREL BISULFATE SCH MG: 75 TABLET ORAL at 09:57

## 2022-01-05 RX ADMIN — CHLORHEXIDINE GLUCONATE SCH ML: 1.2 RINSE ORAL at 09:55

## 2022-01-05 RX ADMIN — Medication SCH CAP: at 09:55

## 2022-01-05 RX ADMIN — INSULIN LISPRO SCH UNITS: 100 INJECTION, SOLUTION INTRAVENOUS; SUBCUTANEOUS at 16:30

## 2022-01-05 RX ADMIN — DEXAMETHASONE SODIUM PHOSPHATE SCH MG: 4 INJECTION, SOLUTION INTRAMUSCULAR; INTRAVENOUS at 09:56

## 2022-01-05 RX ADMIN — GUAIFENESIN AND CODEINE PHOSPHATE PRN ML: 10; 100 LIQUID ORAL at 09:55

## 2022-01-05 RX ADMIN — BENZONATATE SCH MG: 100 CAPSULE, LIQUID FILLED ORAL at 22:30

## 2022-01-05 RX ADMIN — BENZONATATE SCH MG: 100 CAPSULE, LIQUID FILLED ORAL at 09:55

## 2022-01-05 RX ADMIN — INSULIN GLARGINE SCH UNIT: 100 INJECTION, SOLUTION SUBCUTANEOUS at 09:00

## 2022-01-05 RX ADMIN — GABAPENTIN SCH MG: 400 CAPSULE ORAL at 05:52

## 2022-01-05 RX ADMIN — DOXYCYCLINE HYCLATE SCH MG: 100 TABLET, COATED ORAL at 22:29

## 2022-01-05 RX ADMIN — INSULIN LISPRO SCH UNITS: 100 INJECTION, SOLUTION INTRAVENOUS; SUBCUTANEOUS at 07:30

## 2022-01-05 RX ADMIN — CHLORHEXIDINE GLUCONATE SCH ML: 1.2 RINSE ORAL at 21:00

## 2022-01-05 RX ADMIN — GABAPENTIN SCH MG: 400 CAPSULE ORAL at 22:30

## 2022-01-05 RX ADMIN — REMDESIVIR SCH MLS/HR: 100 INJECTION, POWDER, LYOPHILIZED, FOR SOLUTION INTRAVENOUS at 14:01

## 2022-01-05 RX ADMIN — DOXYCYCLINE HYCLATE SCH MG: 100 TABLET, COATED ORAL at 14:02

## 2022-01-05 RX ADMIN — OXYCODONE HYDROCHLORIDE AND ACETAMINOPHEN PRN TAB: 5; 325 TABLET ORAL at 09:55

## 2022-01-05 RX ADMIN — INSULIN LISPRO SCH UNITS: 100 INJECTION, SOLUTION INTRAVENOUS; SUBCUTANEOUS at 11:51

## 2022-01-05 RX ADMIN — BENZONATATE SCH MG: 100 CAPSULE, LIQUID FILLED ORAL at 14:02

## 2022-01-05 RX ADMIN — LISINOPRIL SCH MG: 20 TABLET ORAL at 09:56

## 2022-01-05 RX ADMIN — Medication SCH MG: at 09:56

## 2022-01-05 NOTE — PDOC
[employer] PROGRESS NOTE


Date of Service


DOS:


DATE: 1/5/22 


TIME: 11:26





Chief Complaint


Chief Complaint


acute hypoxic respiratory failure


COVID+ pneumonia


DM2, acute hyperglycemia


acute weakness


diarrhea





History of Present Illness


History of Present Illness


01/05/2022


Pt seen and examined 


Discussed with RN


Chart reviewed 


Pt resting in bed, requires 15L O2 today 


Continue COVID protocol





01/04/2022


Pt seen and examined 


Discussed with RN


Chart reviewed 


Pt resting in bed, NAD 





01/03/2022


Pt seen and examined 


Discussed with RN


Chart reviewed


Continue COVID protocol with decadron, remdesivir, Vit C and Zinc





increase the lantus and SSI and meal


add percocet and Robitsussin with codeine


he looks worse, weak,  can barely eat,  poor po intake,  on IV fluids, 


blood sugar high,  may transtion to PPN if that improved,





Vitals/I&O


Vitals/I&O:





                                   Vital Signs








  Date Time  Temp Pulse Resp B/P (MAP) Pulse Ox O2 Delivery O2 Flow Rate FiO2


 


1/5/22 09:56  67  139/71    


 


1/5/22 09:55     87 Nasal Cannula 6.0 


 


1/5/22 07:00 97.8  18     





 97.8       














                                    I & O   


 


 1/4/22 1/4/22 1/5/22





 15:00 23:00 07:00


 


Output Total  0 ml 800 ml


 


Balance  0 ml -800 ml











Physical Exam


General:  Alert, Cooperative, moderate distress


Abdomen:  Soft


Extremities:  No clubbing, No edema


Skin:  No rashes, No significant lesion





Labs


Labs:





Laboratory Tests








Test


 1/4/22


17:23 1/4/22


21:05 1/5/22


07:26 1/5/22


11:14


 


Glucose (Fingerstick)


 265 mg/dL


(70-99) 221 mg/dL


(70-99) 70 mg/dL


(70-99) 117 mg/dL


(70-99)











Assessment and Plan


Assessmemt and Plan


Problems


Medical Problems:


(1) COVID-19


Status: Acute  





(2) Pneumonia


Status: Acute  





(3) Respiratory distress


Status: Acute  








acute hypoxic respiratory failure


COVID+ pneumonia


DM2, acute hyperglycemia


acute weakness


diarrhea





Plan: 


Continue COVID protocol with decadron + remdesivir 


Continue Vitamin C and Zinc 


Supplemental O2 as needed, wean as tolerated 


Continue robutussin and codeine


Continue Decadron for total 10 days 


Lovenox for DVT prophylaxis 


Continue empiric ABX





Comment


Review of Relevant


I have reviewed the following items zainab (where applicable) has been applied.





Justifications for Admission


Other Justification














HEDY BLANCHARD III DO            Jan 5, 2022 11:27

## 2022-01-06 VITALS — DIASTOLIC BLOOD PRESSURE: 76 MMHG | SYSTOLIC BLOOD PRESSURE: 163 MMHG

## 2022-01-06 VITALS — SYSTOLIC BLOOD PRESSURE: 128 MMHG | DIASTOLIC BLOOD PRESSURE: 64 MMHG

## 2022-01-06 VITALS — SYSTOLIC BLOOD PRESSURE: 153 MMHG | DIASTOLIC BLOOD PRESSURE: 79 MMHG

## 2022-01-06 VITALS — DIASTOLIC BLOOD PRESSURE: 74 MMHG | SYSTOLIC BLOOD PRESSURE: 147 MMHG

## 2022-01-06 VITALS — SYSTOLIC BLOOD PRESSURE: 137 MMHG | DIASTOLIC BLOOD PRESSURE: 75 MMHG

## 2022-01-06 VITALS — DIASTOLIC BLOOD PRESSURE: 90 MMHG | SYSTOLIC BLOOD PRESSURE: 187 MMHG

## 2022-01-06 RX ADMIN — GABAPENTIN SCH MG: 400 CAPSULE ORAL at 06:00

## 2022-01-06 RX ADMIN — CHLORHEXIDINE GLUCONATE SCH ML: 1.2 RINSE ORAL at 09:17

## 2022-01-06 RX ADMIN — INSULIN LISPRO SCH UNITS: 100 INJECTION, SOLUTION INTRAVENOUS; SUBCUTANEOUS at 07:30

## 2022-01-06 RX ADMIN — ASPIRIN 81 MG SCH MG: 81 TABLET ORAL at 09:16

## 2022-01-06 RX ADMIN — INSULIN LISPRO SCH UNITS: 100 INJECTION, SOLUTION INTRAVENOUS; SUBCUTANEOUS at 11:30

## 2022-01-06 RX ADMIN — Medication SCH CAP: at 22:25

## 2022-01-06 RX ADMIN — DOXYCYCLINE HYCLATE SCH MG: 100 TABLET, COATED ORAL at 09:17

## 2022-01-06 RX ADMIN — INSULIN LISPRO SCH UNITS: 100 INJECTION, SOLUTION INTRAVENOUS; SUBCUTANEOUS at 12:00

## 2022-01-06 RX ADMIN — GABAPENTIN SCH MG: 400 CAPSULE ORAL at 22:25

## 2022-01-06 RX ADMIN — INSULIN LISPRO SCH UNITS: 100 INJECTION, SOLUTION INTRAVENOUS; SUBCUTANEOUS at 08:00

## 2022-01-06 RX ADMIN — Medication SCH MG: at 09:16

## 2022-01-06 RX ADMIN — INSULIN GLARGINE SCH UNIT: 100 INJECTION, SOLUTION SUBCUTANEOUS at 22:30

## 2022-01-06 RX ADMIN — GABAPENTIN SCH MG: 400 CAPSULE ORAL at 14:36

## 2022-01-06 RX ADMIN — BENZONATATE SCH MG: 100 CAPSULE, LIQUID FILLED ORAL at 09:16

## 2022-01-06 RX ADMIN — LISINOPRIL SCH MG: 20 TABLET ORAL at 09:17

## 2022-01-06 RX ADMIN — DEXAMETHASONE SODIUM PHOSPHATE SCH MG: 4 INJECTION, SOLUTION INTRAMUSCULAR; INTRAVENOUS at 09:17

## 2022-01-06 RX ADMIN — ENOXAPARIN SODIUM SCH MG: 40 INJECTION SUBCUTANEOUS at 14:37

## 2022-01-06 RX ADMIN — BENZONATATE SCH MG: 100 CAPSULE, LIQUID FILLED ORAL at 14:36

## 2022-01-06 RX ADMIN — Medication SCH CAP: at 09:16

## 2022-01-06 RX ADMIN — CHLORHEXIDINE GLUCONATE SCH ML: 1.2 RINSE ORAL at 22:25

## 2022-01-06 RX ADMIN — INSULIN LISPRO SCH UNITS: 100 INJECTION, SOLUTION INTRAVENOUS; SUBCUTANEOUS at 18:15

## 2022-01-06 RX ADMIN — BENZONATATE SCH MG: 100 CAPSULE, LIQUID FILLED ORAL at 22:24

## 2022-01-06 RX ADMIN — INSULIN GLARGINE SCH UNIT: 100 INJECTION, SOLUTION SUBCUTANEOUS at 09:00

## 2022-01-06 RX ADMIN — DOXYCYCLINE HYCLATE SCH MG: 100 TABLET, COATED ORAL at 22:25

## 2022-01-06 RX ADMIN — ZINC SULFATE CAP 220 MG (50 MG ELEMENTAL ZN) SCH MG: 220 (50 ZN) CAP at 09:16

## 2022-01-06 RX ADMIN — CLOPIDOGREL BISULFATE SCH MG: 75 TABLET ORAL at 09:16

## 2022-01-06 NOTE — PDOC
[employer] [facility] PROGRESS NOTE


Date of Service


DOS:


DATE: 1/6/22 


TIME: 11:26





Chief Complaint


Chief Complaint


acute hypoxic respiratory failure


COVID+ pneumonia


DM2, acute hyperglycemia


acute weakness


diarrhea





History of Present Illness


History of Present Illness


01/06/2022


Pt seen and examined 


Discussed with RN


Chart reviewed 


Pt resting in bed, requires 12L O2 today 


Continue COVID protocol


Added Norsvac due to high BP overnight 


Decreased Lantus to 1x daily bc patient not eating well, withheld humalog this 

AM 





01/05/2022


Pt seen and examined 


Discussed with RN


Chart reviewed 


Pt resting in bed, requires 15L O2 today 


Continue COVID protocol





01/04/2022


Pt seen and examined 


Discussed with RN


Chart reviewed 


Pt resting in bed, NAD 





01/03/2022


Pt seen and examined 


Discussed with RN


Chart reviewed


Continue COVID protocol with decadron, remdesivir, Vit C and Zinc





increase the lantus and SSI and meal


add percocet and Robitsussin with codeine


he looks worse, weak,  can barely eat,  poor po intake,  on IV fluids, 


blood sugar high,  may transtion to PPN if that improved,





Vitals/I&O


Vitals/I&O:





                                   Vital Signs








  Date Time  Temp Pulse Resp B/P (MAP) Pulse Ox O2 Delivery O2 Flow Rate FiO2


 


1/6/22 10:14 98.1 74 18 128/64 (85) 92 Nasal Cannula 10.0 





 98.1       














                                    I & O   


 


 1/5/22 1/5/22 1/6/22





 15:00 23:00 07:00


 


Intake Total  60 ml 


 


Output Total   500 ml


 


Balance  60 ml -500 ml











Physical Exam


General:  Alert, Cooperative, moderate distress


Abdomen:  Soft


Extremities:  No clubbing, No edema


Skin:  No rashes, No significant lesion





Labs


Labs:





Laboratory Tests








Test


 1/5/22


17:28 1/5/22


21:03 1/6/22


08:16


 


Glucose (Fingerstick)


 260 mg/dL


(70-99) 224 mg/dL


(70-99) 87 mg/dL


(70-99)











Assessment and Plan


Assessmemt and Plan


Problems


Medical Problems:


(1) COVID-19


Status: Acute  





(2) Pneumonia


Status: Acute  





(3) Respiratory distress


Status: Acute  





acute hypoxic respiratory failure


COVID+ pneumonia


DM2, acute hyperglycemia


acute weakness


diarrhea





Plan: 


Continue COVID protocol with decadron + remdesivir 


Continue Vitamin C and Zinc 


Supplemental O2 as needed, wean as tolerated 


Continue robutussin and codeine


Continue Decadron for total 10 days 


Lovenox for DVT prophylaxis 


Continue empiric ABX


Continue Lisinopril 20mg PO daily for BP management 


Added Norsvac 10mg PO daily due to high BP overnight 


Continue Lantus and Humalog for hyperglycemia, Lantus decreased to 1x per day





Comment


Review of Relevant


I have reviewed the following items zainab (where applicable) has been applied.





Justifications for Admission


Other Justification














HEDY BLANCHARD III DO            Jan 6, 2022 11:29

## 2022-01-07 VITALS — SYSTOLIC BLOOD PRESSURE: 150 MMHG | DIASTOLIC BLOOD PRESSURE: 74 MMHG

## 2022-01-07 VITALS — SYSTOLIC BLOOD PRESSURE: 152 MMHG | DIASTOLIC BLOOD PRESSURE: 80 MMHG

## 2022-01-07 VITALS — DIASTOLIC BLOOD PRESSURE: 83 MMHG | SYSTOLIC BLOOD PRESSURE: 161 MMHG

## 2022-01-07 VITALS — SYSTOLIC BLOOD PRESSURE: 149 MMHG | DIASTOLIC BLOOD PRESSURE: 80 MMHG

## 2022-01-07 VITALS — DIASTOLIC BLOOD PRESSURE: 74 MMHG | SYSTOLIC BLOOD PRESSURE: 144 MMHG

## 2022-01-07 VITALS — SYSTOLIC BLOOD PRESSURE: 148 MMHG | DIASTOLIC BLOOD PRESSURE: 71 MMHG

## 2022-01-07 LAB
ANION GAP SERPL CALC-SCNC: 5 MMOL/L (ref 6–14)
BUN SERPL-MCNC: 24 MG/DL (ref 8–26)
CALCIUM SERPL-MCNC: 7.4 MG/DL (ref 8.5–10.1)
CHLORIDE SERPL-SCNC: 107 MMOL/L (ref 98–107)
CO2 SERPL-SCNC: 27 MMOL/L (ref 21–32)
CREAT SERPL-MCNC: 0.8 MG/DL (ref 0.7–1.3)
ERYTHROCYTE [DISTWIDTH] IN BLOOD BY AUTOMATED COUNT: 15.2 % (ref 11.5–14.5)
GFR SERPLBLD BASED ON 1.73 SQ M-ARVRAT: 97.6 ML/MIN
GLUCOSE SERPL-MCNC: 97 MG/DL (ref 70–99)
HCT VFR BLD CALC: 39.4 % (ref 39–53)
HGB BLD-MCNC: 13.3 G/DL (ref 13–17.5)
MCH RBC QN AUTO: 28 PG (ref 25–35)
MCHC RBC AUTO-ENTMCNC: 34 G/DL (ref 31–37)
MCV RBC AUTO: 83 FL (ref 79–100)
PLATELET # BLD AUTO: 414 X10^3/UL (ref 140–400)
POTASSIUM SERPL-SCNC: 3.8 MMOL/L (ref 3.5–5.1)
RBC # BLD AUTO: 4.75 X10^6/UL (ref 4.3–5.7)
SODIUM SERPL-SCNC: 139 MMOL/L (ref 136–145)
WBC # BLD AUTO: 12 X10^3/UL (ref 4–11)

## 2022-01-07 RX ADMIN — INSULIN LISPRO SCH UNITS: 100 INJECTION, SOLUTION INTRAVENOUS; SUBCUTANEOUS at 18:12

## 2022-01-07 RX ADMIN — ASPIRIN 81 MG SCH MG: 81 TABLET ORAL at 08:54

## 2022-01-07 RX ADMIN — ENOXAPARIN SODIUM SCH MG: 40 INJECTION SUBCUTANEOUS at 12:56

## 2022-01-07 RX ADMIN — INSULIN LISPRO SCH UNITS: 100 INJECTION, SOLUTION INTRAVENOUS; SUBCUTANEOUS at 08:00

## 2022-01-07 RX ADMIN — CHLORHEXIDINE GLUCONATE SCH ML: 1.2 RINSE ORAL at 23:31

## 2022-01-07 RX ADMIN — DOXYCYCLINE HYCLATE SCH MG: 100 TABLET, COATED ORAL at 08:55

## 2022-01-07 RX ADMIN — BENZONATATE SCH MG: 100 CAPSULE, LIQUID FILLED ORAL at 23:31

## 2022-01-07 RX ADMIN — DOXYCYCLINE HYCLATE SCH MG: 100 TABLET, COATED ORAL at 23:31

## 2022-01-07 RX ADMIN — INSULIN LISPRO SCH UNITS: 100 INJECTION, SOLUTION INTRAVENOUS; SUBCUTANEOUS at 18:11

## 2022-01-07 RX ADMIN — INSULIN LISPRO SCH UNITS: 100 INJECTION, SOLUTION INTRAVENOUS; SUBCUTANEOUS at 07:30

## 2022-01-07 RX ADMIN — Medication SCH MG: at 08:54

## 2022-01-07 RX ADMIN — Medication SCH CAP: at 08:54

## 2022-01-07 RX ADMIN — INSULIN GLARGINE SCH UNIT: 100 INJECTION, SOLUTION SUBCUTANEOUS at 23:32

## 2022-01-07 RX ADMIN — GABAPENTIN SCH MG: 400 CAPSULE ORAL at 06:06

## 2022-01-07 RX ADMIN — GABAPENTIN SCH MG: 400 CAPSULE ORAL at 15:00

## 2022-01-07 RX ADMIN — BENZONATATE SCH MG: 100 CAPSULE, LIQUID FILLED ORAL at 08:54

## 2022-01-07 RX ADMIN — INSULIN LISPRO SCH UNITS: 100 INJECTION, SOLUTION INTRAVENOUS; SUBCUTANEOUS at 12:00

## 2022-01-07 RX ADMIN — CHLORHEXIDINE GLUCONATE SCH ML: 1.2 RINSE ORAL at 08:53

## 2022-01-07 RX ADMIN — Medication SCH CAP: at 21:00

## 2022-01-07 RX ADMIN — DEXAMETHASONE SODIUM PHOSPHATE SCH MG: 4 INJECTION, SOLUTION INTRAMUSCULAR; INTRAVENOUS at 08:55

## 2022-01-07 RX ADMIN — ZINC SULFATE CAP 220 MG (50 MG ELEMENTAL ZN) SCH MG: 220 (50 ZN) CAP at 08:54

## 2022-01-07 RX ADMIN — BENZONATATE SCH MG: 100 CAPSULE, LIQUID FILLED ORAL at 15:00

## 2022-01-07 RX ADMIN — GABAPENTIN SCH MG: 400 CAPSULE ORAL at 23:31

## 2022-01-07 RX ADMIN — INSULIN LISPRO SCH UNITS: 100 INJECTION, SOLUTION INTRAVENOUS; SUBCUTANEOUS at 12:59

## 2022-01-07 RX ADMIN — CLOPIDOGREL BISULFATE SCH MG: 75 TABLET ORAL at 08:54

## 2022-01-07 RX ADMIN — GUAIFENESIN AND CODEINE PHOSPHATE PRN ML: 10; 100 LIQUID ORAL at 06:16

## 2022-01-07 RX ADMIN — LISINOPRIL SCH MG: 20 TABLET ORAL at 08:55

## 2022-01-07 NOTE — PDOC
PULMONARY PROGRESS NOTES


DATE: 1/7/22 


TIME: 11:52


Subjective


Patient remains on 10 L nasal cannula.


Denies any shortness of breath


Vitals





Vital Signs








  Date Time  Temp Pulse Resp B/P (MAP) Pulse Ox O2 Delivery O2 Flow Rate FiO2


 


1/7/22 11:00 98.1 79 18 161/83 (109) 94 Nasal Cannula 10.0 





 98.1       








Comments


Patient seen during the COVID-19 viral pandemic, visual inspection not utilizing

accessory muscles no paroxysmal breathing pattern no rashes


Labs





Laboratory Tests








Test


 1/5/22


17:28 1/5/22


21:03 1/6/22


08:16 1/6/22


12:19


 


Glucose (Fingerstick)


 260 mg/dL


(70-99) 224 mg/dL


(70-99) 87 mg/dL


(70-99) 103 mg/dL


(70-99)


 


Test


 1/6/22


17:28 1/6/22


19:48 1/7/22


06:30 1/7/22


08:15


 


Glucose (Fingerstick)


 322 mg/dL


(70-99) 393 mg/dL


(70-99) 


 83 mg/dL


(70-99)


 


White Blood Count


 


 


 12.0 x10^3/uL


(4.0-11.0) 





 


Red Blood Count


 


 


 4.75 x10^6/uL


(4.30-5.70) 





 


Hemoglobin


 


 


 13.3 g/dL


(13.0-17.5) 





 


Hematocrit


 


 


 39.4 %


(39.0-53.0) 





 


Mean Corpuscular Volume   83 fL ()  


 


Mean Corpuscular Hemoglobin   28 pg (25-35)  


 


Mean Corpuscular Hemoglobin


Concent 


 


 34 g/dL


(31-37) 





 


Red Cell Distribution Width


 


 


 15.2 %


(11.5-14.5) 





 


Platelet Count


 


 


 414 x10^3/uL


(140-400) 





 


Sodium Level


 


 


 139 mmol/L


(136-145) 





 


Potassium Level


 


 


 3.8 mmol/L


(3.5-5.1) 





 


Chloride Level


 


 


 107 mmol/L


() 





 


Carbon Dioxide Level


 


 


 27 mmol/L


(21-32) 





 


Anion Gap   5 (6-14)  


 


Blood Urea Nitrogen


 


 


 24 mg/dL


(8-26) 





 


Creatinine


 


 


 0.8 mg/dL


(0.7-1.3) 





 


Estimated GFR


(Cockcroft-Gault) 


 


 97.6 


 





 


Glucose Level


 


 


 97 mg/dL


(70-99) 





 


Calcium Level


 


 


 7.4 mg/dL


(8.5-10.1) 











Laboratory Tests








Test


 1/6/22


12:19 1/6/22


17:28 1/6/22


19:48 1/7/22


06:30


 


Glucose (Fingerstick)


 103 mg/dL


(70-99) 322 mg/dL


(70-99) 393 mg/dL


(70-99) 





 


White Blood Count


 


 


 


 12.0 x10^3/uL


(4.0-11.0)


 


Red Blood Count


 


 


 


 4.75 x10^6/uL


(4.30-5.70)


 


Hemoglobin


 


 


 


 13.3 g/dL


(13.0-17.5)


 


Hematocrit


 


 


 


 39.4 %


(39.0-53.0)


 


Mean Corpuscular Volume    83 fL () 


 


Mean Corpuscular Hemoglobin    28 pg (25-35) 


 


Mean Corpuscular Hemoglobin


Concent 


 


 


 34 g/dL


(31-37)


 


Red Cell Distribution Width


 


 


 


 15.2 %


(11.5-14.5)


 


Platelet Count


 


 


 


 414 x10^3/uL


(140-400)


 


Sodium Level


 


 


 


 139 mmol/L


(136-145)


 


Potassium Level


 


 


 


 3.8 mmol/L


(3.5-5.1)


 


Chloride Level


 


 


 


 107 mmol/L


()


 


Carbon Dioxide Level


 


 


 


 27 mmol/L


(21-32)


 


Anion Gap    5 (6-14) 


 


Blood Urea Nitrogen


 


 


 


 24 mg/dL


(8-26)


 


Creatinine


 


 


 


 0.8 mg/dL


(0.7-1.3)


 


Estimated GFR


(Cockcroft-Gault) 


 


 


 97.6 





 


Glucose Level


 


 


 


 97 mg/dL


(70-99)


 


Calcium Level


 


 


 


 7.4 mg/dL


(8.5-10.1)


 


Test


 1/7/22


08:15 


 


 





 


Glucose (Fingerstick)


 83 mg/dL


(70-99) 


 


 











Medications





Active Scripts








 Medications  Dose


 Route/Sig


 Max Daily Dose Days Date Category Dose


Instructions


 


 Azithromycin


 Tablet


  (Azithromycin)


 250 Mg Tablet  1 Pkg


 PO UD


  5 12/29/21 Rx  2 the first day followed by 1 for days 2-5


 


 Hydrocodone-Apap


 5-325  **


  (Hydrocodone


 Bit/Acetaminophen)


 1 Tab Tablet  1 Tab


 PO PRN Q6HRS PRN


  3 5/28/21 Rx 


 


 Keflex


  (Cephalexin) 500


 Mg Capsule  1 Cap


 PO TID


  10 6/28/20 Rx 


 


 Norco 5-325


 Tablet


  (Acetaminophen/Hydrocodone


 Bitart) 1 Each


 Tablet  1 Tab


 PO PRN Q6HRS PRN


   4/6/20 Rx 


 


 Keflex


  (Cephalexin) 500


 Mg Capsule  1 Cap


 PO TID


  7 12/4/19 Rx 


 


 Peridex


  (Chlorhexidine


 Gluconate) 15 Ml


 Mouthwash  15 Ml


 SWSP BID


  7 12/4/19 Rx  Brush teeth before using to prevent staining. Swish for


 approximately 30 seconds before spitting.


 


 Naproxen 500 Mg


 Tablet  1 Tab


 PO BID PRN


  10 12/4/19 Rx 


 


 Clopidogrel


  (Clopidogrel


 Bisulfate) 75 Mg


 Tablet  75 Mg


 PO DAILYWBKFT


  30 6/24/19 Rx 


 


 Viagra


  (Sildenafil


 Citrate) 100 Mg


 Tablet  1 Tab


 PO PRN DAILY


   6/24/19 Reported 


 


 Nystatin 15 Gm


 Powder  1 Qasim


 TP BID


   6/24/19 Reported 


 


 Mupirocin


 Ointment


  (Mupirocin) 22 Gm


 Oint...g.  1 Qasim


 TP DAILY


   6/24/19 Reported 


 


 Lisinopril 20 Mg


 Tablet  1 Tab


 PO DAILY


   6/24/19 Reported 


 


 Gabapentin 800 Mg


 Tablet  800 Mg


 PO Q8HRS


   6/24/19 Reported 


 


 Atorvastatin


 Calcium 80 Mg


 Tablet  1 Tab


 PO DAILY


   6/24/19 Reported 


 


 Janumet 50-1,000


 Mg Tablet


  (Sitagliptin


 Phos/Metformin


 Hcl) 1 Each Tablet  1 Tab


 PO BID


   8/30/18 Reported 


 


 Pentoxifylline


 400 Mg Tablet.er  400 Mg


 PO TID


   8/30/18 Reported 


 


 Cymbalta


  (Duloxetine Hcl)


 30 Mg Capsule.dr  30 Mg


 PO QHS


   8/30/18 Reported 


 


 Glipizide 10 Mg


 Tablet  1 Tab


 PO BID


   8/30/18 Reported 


 


 Aspirin 81 Mg


 Tab.chew  1 Tab


 PO DAILY


   10/27/15 Reported 











Impression


.


IMPRESSION:


1.  Acute hypoxic respiratory failure secondary to COVID-19 viral pne

umonia.//Acute exacerbation of COPD.


2.  COVID-19 viral pneumonia, acute respiratory distress syndrome.


3.  Coronary artery disease, status post coronary artery bypass grafting.


4.  Diabetes.





Plan


.





Continue oxygen supplementation FiO2 to keep saturations 92% and above.


Status post 5 days of remdesivir


Dexamethasone for total of 10 days.


Empiric antibiotics


DVT prophylaxis


Monitor blood sugars


Clinically stable.


Continue present supportive care.











SANJU NEGRO MD                  Jan 7, 2022 11:53

## 2022-01-07 NOTE — PDOC
[employer] PROGRESS NOTE


Date of Service


DOS:


DATE: 1/7/22 


TIME: 11:28





Chief Complaint


Chief Complaint


acute hypoxic respiratory failure


COVID+ pneumonia


DM2, acute hyperglycemia


acute weakness


diarrhea





History of Present Illness


History of Present Illness


1/7/2022


Patient seen and examined


On the commode off his oxygen currently but is supposed to be on 10 L


Discussed with RN


Chart reviewed














01/06/2022


Pt seen and examined 


Discussed with RN


Chart reviewed 


Pt resting in bed, requires 12L O2 today 


Continue COVID protocol


Added Norsvac due to high BP overnight 


Decreased Lantus to 1x daily bc patient not eating well, withheld humalog this 

AM 





01/05/2022


Pt seen and examined 


Discussed with RN


Chart reviewed 


Pt resting in bed, requires 15L O2 today 


Continue COVID protocol





01/04/2022


Pt seen and examined 


Discussed with RN


Chart reviewed 


Pt resting in bed, NAD 





01/03/2022


Pt seen and examined 


Discussed with RN


Chart reviewed


Continue COVID protocol with decadron, remdesivir, Vit C and Zinc





increase the lantus and SSI and meal


add percocet and Robitsussin with codeine


he looks worse, weak,  can barely eat,  poor po intake,  on IV fluids, 


blood sugar high,  may transtion to PPN if that improved,





Vitals/I&O


Vitals/I&O:





                                   Vital Signs








  Date Time  Temp Pulse Resp B/P (MAP) Pulse Ox O2 Delivery O2 Flow Rate FiO2


 


1/7/22 08:55  81  152/80    


 


1/7/22 07:00 98.0  20  90 Nasal Cannula 10.0 





 98.0       














                                    I & O   


 


 1/6/22 1/6/22 1/7/22





 15:00 23:00 07:00


 


Intake Total 720 ml 600 ml 240 ml


 


Output Total  600 ml 700 ml


 


Balance 720 ml 0 ml -460 ml











Physical Exam


General:  Alert, Cooperative, moderate distress


Abdomen:  Soft


Extremities:  No clubbing, No edema


Skin:  No rashes, No significant lesion





Labs


Labs:





Laboratory Tests








Test


 1/6/22


12:19 1/6/22


17:28 1/6/22


19:48 1/7/22


06:30


 


Glucose (Fingerstick)


 103 mg/dL


(70-99) 322 mg/dL


(70-99) 393 mg/dL


(70-99) 





 


White Blood Count


 


 


 


 12.0 x10^3/uL


(4.0-11.0)


 


Red Blood Count


 


 


 


 4.75 x10^6/uL


(4.30-5.70)


 


Hemoglobin


 


 


 


 13.3 g/dL


(13.0-17.5)


 


Hematocrit


 


 


 


 39.4 %


(39.0-53.0)


 


Mean Corpuscular Volume    83 fL () 


 


Mean Corpuscular Hemoglobin    28 pg (25-35) 


 


Mean Corpuscular Hemoglobin


Concent 


 


 


 34 g/dL


(31-37)


 


Red Cell Distribution Width


 


 


 


 15.2 %


(11.5-14.5)


 


Platelet Count


 


 


 


 414 x10^3/uL


(140-400)


 


Sodium Level


 


 


 


 139 mmol/L


(136-145)


 


Potassium Level


 


 


 


 3.8 mmol/L


(3.5-5.1)


 


Chloride Level


 


 


 


 107 mmol/L


()


 


Carbon Dioxide Level


 


 


 


 27 mmol/L


(21-32)


 


Anion Gap    5 (6-14) 


 


Blood Urea Nitrogen


 


 


 


 24 mg/dL


(8-26)


 


Creatinine


 


 


 


 0.8 mg/dL


(0.7-1.3)


 


Estimated GFR


(Cockcroft-Gault) 


 


 


 97.6 





 


Glucose Level


 


 


 


 97 mg/dL


(70-99)


 


Calcium Level


 


 


 


 7.4 mg/dL


(8.5-10.1)


 


Test


 1/7/22


08:15 


 


 





 


Glucose (Fingerstick)


 83 mg/dL


(70-99) 


 


 














Assessment and Plan


Assessmemt and Plan


Problems


Medical Problems:


(1) COVID-19


Status: Acute  





(2) Pneumonia


Status: Acute  





(3) Respiratory distress


Status: Acute  





acute hypoxic respiratory failure


COVID+ pneumonia


DM2, acute hyperglycemia


acute weakness


diarrhea





Plan: 


Continue COVID protocol with decadron + remdesivir 


Continue Vitamin C and Zinc 


Supplemental O2 as needed, wean as tolerated 


Continue robutussin and codeine


Continue Decadron for total 10 days 


Lovenox for DVT prophylaxis 


Continue empiric ABX


Continue Lisinopril 20mg PO daily for BP management 


Yesterday we added Norsvac 10mg PO daily due to high BP overnight 


Continue Lantus and Humalog for hyperglycemia, Lantus decreased to 1x per day


Home meds


Full code


Long-term prognosis guarded





Comment


Review of Relevant


I have reviewed the following items zainab (where applicable) has been applied.


Medications:





Current Medications








 Medications


  (Trade)  Dose


 Ordered  Sig/Makenzie


 Route


 PRN Reason  Start Time


 Stop Time Status Last Admin


Dose Admin


 


 Amlodipine


 Besylate


  (Norvasc)  10 mg  DAILY


 PO


   1/6/22 12:00


    1/7/22 08:54





 


 Insulin Glargine


  (Lantus Syringe)  30 unit  HS


 SQ


   1/6/22 21:00


    1/6/22 22:30














Justifications for Admission


Other Justification














HEDY BLANCHARD III DO            Jan 7, 2022 11:30

## 2022-01-08 VITALS — DIASTOLIC BLOOD PRESSURE: 65 MMHG | SYSTOLIC BLOOD PRESSURE: 133 MMHG

## 2022-01-08 VITALS — SYSTOLIC BLOOD PRESSURE: 150 MMHG | DIASTOLIC BLOOD PRESSURE: 88 MMHG

## 2022-01-08 VITALS — DIASTOLIC BLOOD PRESSURE: 82 MMHG | SYSTOLIC BLOOD PRESSURE: 141 MMHG

## 2022-01-08 VITALS — SYSTOLIC BLOOD PRESSURE: 156 MMHG | DIASTOLIC BLOOD PRESSURE: 72 MMHG

## 2022-01-08 VITALS — SYSTOLIC BLOOD PRESSURE: 155 MMHG | DIASTOLIC BLOOD PRESSURE: 83 MMHG

## 2022-01-08 VITALS — SYSTOLIC BLOOD PRESSURE: 141 MMHG | DIASTOLIC BLOOD PRESSURE: 71 MMHG

## 2022-01-08 RX ADMIN — ENOXAPARIN SODIUM SCH MG: 40 INJECTION SUBCUTANEOUS at 10:01

## 2022-01-08 RX ADMIN — INSULIN LISPRO SCH UNITS: 100 INJECTION, SOLUTION INTRAVENOUS; SUBCUTANEOUS at 17:48

## 2022-01-08 RX ADMIN — DOXYCYCLINE HYCLATE SCH MG: 100 TABLET, COATED ORAL at 22:01

## 2022-01-08 RX ADMIN — CHLORHEXIDINE GLUCONATE SCH ML: 1.2 RINSE ORAL at 10:02

## 2022-01-08 RX ADMIN — DOXYCYCLINE HYCLATE SCH MG: 100 TABLET, COATED ORAL at 10:00

## 2022-01-08 RX ADMIN — ZINC SULFATE CAP 220 MG (50 MG ELEMENTAL ZN) SCH MG: 220 (50 ZN) CAP at 10:00

## 2022-01-08 RX ADMIN — INSULIN LISPRO SCH UNITS: 100 INJECTION, SOLUTION INTRAVENOUS; SUBCUTANEOUS at 07:30

## 2022-01-08 RX ADMIN — CHLORHEXIDINE GLUCONATE SCH ML: 1.2 RINSE ORAL at 22:01

## 2022-01-08 RX ADMIN — BENZONATATE SCH MG: 100 CAPSULE, LIQUID FILLED ORAL at 09:59

## 2022-01-08 RX ADMIN — DEXAMETHASONE SODIUM PHOSPHATE SCH MG: 4 INJECTION, SOLUTION INTRAMUSCULAR; INTRAVENOUS at 10:02

## 2022-01-08 RX ADMIN — GABAPENTIN SCH MG: 400 CAPSULE ORAL at 17:54

## 2022-01-08 RX ADMIN — CLOPIDOGREL BISULFATE SCH MG: 75 TABLET ORAL at 10:01

## 2022-01-08 RX ADMIN — BENZONATATE SCH MG: 100 CAPSULE, LIQUID FILLED ORAL at 17:54

## 2022-01-08 RX ADMIN — GABAPENTIN SCH MG: 400 CAPSULE ORAL at 06:16

## 2022-01-08 RX ADMIN — Medication SCH CAP: at 22:01

## 2022-01-08 RX ADMIN — INSULIN LISPRO SCH UNITS: 100 INJECTION, SOLUTION INTRAVENOUS; SUBCUTANEOUS at 11:30

## 2022-01-08 RX ADMIN — Medication SCH CAP: at 10:00

## 2022-01-08 RX ADMIN — ASPIRIN 81 MG SCH MG: 81 TABLET ORAL at 10:00

## 2022-01-08 RX ADMIN — INSULIN LISPRO SCH UNITS: 100 INJECTION, SOLUTION INTRAVENOUS; SUBCUTANEOUS at 17:46

## 2022-01-08 RX ADMIN — INSULIN LISPRO SCH UNITS: 100 INJECTION, SOLUTION INTRAVENOUS; SUBCUTANEOUS at 17:00

## 2022-01-08 RX ADMIN — INSULIN LISPRO SCH UNITS: 100 INJECTION, SOLUTION INTRAVENOUS; SUBCUTANEOUS at 17:47

## 2022-01-08 RX ADMIN — BENZONATATE SCH MG: 100 CAPSULE, LIQUID FILLED ORAL at 21:00

## 2022-01-08 RX ADMIN — GABAPENTIN SCH MG: 400 CAPSULE ORAL at 22:01

## 2022-01-08 RX ADMIN — INSULIN GLARGINE SCH UNIT: 100 INJECTION, SOLUTION SUBCUTANEOUS at 22:04

## 2022-01-08 RX ADMIN — Medication SCH MG: at 10:00

## 2022-01-08 RX ADMIN — LISINOPRIL SCH MG: 20 TABLET ORAL at 10:01

## 2022-01-08 RX ADMIN — INSULIN LISPRO SCH UNITS: 100 INJECTION, SOLUTION INTRAVENOUS; SUBCUTANEOUS at 16:30

## 2022-01-08 NOTE — PDOC
GENERAL


General:


Patient examined chart reviewed today is hospital day 9 for this patient with a

cute hypoxic respiratory failure secondary to Covid pneumonia.  He is slowly 

clinically improving though still requiring quite a bit of oxygen.  He has no 

complaints for me today.  Tells me he is eating okay and up and moving about the

room.  Looking forward to getting home as soon as possible.  Sugars are running 

higher will increase glargine to 40 units at bedtime.  Time spent today is 30 

minutes with greater than 50% in counseling and coordination of care most of 

which in discussion with patient regarding care plan and progress.


Problems:  


(1) Acute respiratory failure with hypoxia


(2) Type 2 diabetes mellitus


(3) COVID-19


(4) Coronary artery disease





VITAL SIGNS


Vital Signs/I&O:





                                   Vital Signs








  Date Time  Temp Pulse Resp B/P (MAP) Pulse Ox O2 Delivery O2 Flow Rate FiO2


 


1/8/22 15:00 98.0 79 20 156/72 (100) 96 Nasal Cannula 10.0 





 98.0       














                                    I & O   


 


 1/7/22 1/7/22 1/8/22





 15:00 23:00 07:00


 


Intake Total   240 ml


 


Output Total   550 ml


 


Balance   -310 ml





In general patient is pleasant alert and oriented x3 no acute distress.


HEENT exam is notable for that he is sitting there not wearing his oxygen says 

he wants to try to let his body heal on its own.  He is supposed to be taking 7 

L on nasal cannula he was agreeable to having me put that back on him


Neck is soft and supple no adenopathy or thyromegaly noted


Chest bilateral equal air entry though diminished throughout patient is dyspneic


Heart S1-S2 normal regular rate and rhythm no murmurs or gallops are noted


Abdomen soft nontender nondistended no masses organomegaly noted


Extremity exam is unremarkable for acute abnormality





ALLERGIES


Allergies:





Allergies








Coded Allergies Type Severity Reaction Last Updated Verified


 


  No Known Drug Allergies    9/24/18 No











MEDS


Medications:





Current Medications








 Medications


  (Trade)  Dose


 Ordered  Sig/Makenzie  Start Time


 Stop Time Status Last Admin


Dose Admin


 


 Acetaminophen/


 Hydrocodone Bitart


  (Lortab 5/325)  1 tab  PRN Q6HRS  PRN  1/1/22 10:00


     





 


 Amlodipine


 Besylate


  (Norvasc)  10 mg  DAILY  1/6/22 12:00


    1/8/22 10:00





 


 Ascorbic Acid


  (Vitamin C)  1,000 mg  DAILY  1/1/22 10:30


    1/8/22 10:00





 


 Aspirin


  (Aspirin


 Chewable)  81 mg  DAILY  1/1/22 10:30


    1/8/22 10:00





 


 Benzonatate


  (Tessalon Perle)  100 mg  QRS095  1/1/22 21:00


    1/8/22 09:59





 


 Calcium Carbonate/


 Glycine


  (Tums)  500 mg  PRN AFTMEALHC  PRN  1/1/22 19:15


     





 


 Ceftriaxone Sodium


  (Rocephin)  1 gm  Q24H  1/1/22 21:00


 1/2/22 12:02 DC 1/1/22 21:34





 


 Chlorhexidine


 Gluconate


  (Peridex)  15 ml  BID  1/1/22 10:30


    1/8/22 10:02





 


 Clopidogrel


 Bisulfate


  (Plavix)  75 mg  DAILYWBKFT  1/1/22 10:30


    1/8/22 10:01





 


 Dexamethasone


 Sodium Phosphate


  (Decadron)  6 mg  DAILY  1/1/22 10:30


 1/10/22 21:00  1/8/22 10:02





 


 Dextrose


  (Dextrose


 50%-Water Syringe)  12.5 gm  PRN Q15MIN  PRN  1/1/22 10:00


     





 


 Doxycycline


 Hyclate


  (Vibra-Tab)  100 mg  BID  1/1/22 10:30


    1/8/22 10:00





 


 Doxycycline


 Hyclate 100 mg/


 Dextrose  100 ml @ 


 50 mls/hr  1X  ONCE  12/31/21 21:30


 12/31/21 23:29 DC 12/31/21 22:16





 


 Duloxetine HCl


  (Cymbalta)  30 mg  QHS  1/1/22 21:00


    1/7/22 23:31





 


 Enoxaparin Sodium


  (Lovenox 40mg


 Syringe)  40 mg  Q24H  1/1/22 11:00


    1/8/22 10:01





 


 Enoxaparin Sodium


  (Lovenox Per


 Pharmacy


 Prophylaxis


 Dosing)  1 each  PRN DAILY  PRN  1/1/22 10:00


     





 


 Gabapentin


  (Neurontin)  800 mg  Q8HRS  1/1/22 14:00


    1/8/22 06:16





 


 Guaifenesin/


 Codeine Phosphate


  (Robitussin Ac)  5 ml  PRN Q6HRS  PRN  1/2/22 14:00


    1/7/22 06:16





 


 Info


  (CONTRAST GIVEN


 -- Rx MONITORING)  1 each  PRN DAILY  PRN  12/31/21 20:00


 1/2/22 19:59 DC  





 


 Insulin Glargine


  (Lantus Syringe)  30 unit  HS  1/6/22 21:00


    1/7/22 23:32





 


 Insulin Human


 Lispro


  (HumaLOG)  20 units  TIDAC  1/2/22 16:30


    1/7/22 18:11





 


 Iohexol


  (Omnipaque 350


 Mg/ml)  80 ml  1X  ONCE  12/31/21 20:00


 12/31/21 20:01 DC 12/31/21 20:26





 


 Lactobacillus


 Rhamnosus


  (Culturelle)  1 cap  BID  1/1/22 11:00


    1/8/22 10:00





 


 Lisinopril


  (Prinivil)  20 mg  DAILY  1/1/22 10:30


    1/8/22 10:01





 


 Loperamide HCl


  (Imodium)  4 mg  1X  ONCE  1/1/22 10:30


 1/1/22 10:31 DC 1/1/22 11:22





 


 Metronidazole


  (Flagyl)  500 mg  Q8HRS  1/1/22 14:00


 1/5/22 14:36 DC 1/5/22 14:02





 


 Non-Formulary


 Medication


  (Glipizide )  1 tab  BID  1/1/22 21:00


 1/1/22 10:00 DC  





 


 Non-Formulary


 Medication


  (Sitagliptin


 Phos/Metformin


 Hcl (Janumet


 50-1,000 Mg


 Tablet))  1 tab  BID  1/1/22 21:00


 1/1/22 10:00 DC  





 


 Oxycodone/


 Acetaminophen


  (Percocet 5/325)  1 tab  PRN Q4HRS  PRN  1/2/22 14:00


    1/5/22 09:55





 


 Remdesivir 100 mg/


 Sodium Chloride  230 ml @ 


 460 mls/hr  Q24H  1/2/22 11:00


 1/5/22 11:29 DC 1/5/22 14:01





 


 Remdesivir 200 mg/


 Sodium Chloride  210 ml @ 


 210 mls/hr  1X  ONCE  1/1/22 11:00


 1/1/22 11:59 DC 1/1/22 10:59





 


 Sodium Chloride  1,000 ml @ 


 100 mls/hr  1X  ONCE  1/1/22 10:00


 1/1/22 19:59 DC 1/1/22 10:59





 


 Zinc Sulfate


  (Orazinc)  220 mg  DAILY  1/1/22 10:30


    1/8/22 10:00














LAB


Lab:





                                Laboratory Tests








Test


 1/7/22


17:06 1/7/22


21:34 1/8/22


08:07 1/8/22


11:29


 


Glucose (Fingerstick)


 265 mg/dL


(70-99)  H 289 mg/dL


(70-99)  H 185 mg/dL


(70-99)  H 226 mg/dL


(70-99)  H











ASSESSMENT & PLAN


A&P


Plan as noted above








This note was created using [employer] and may have omissions and/or 

errors due to the nature of real-time voice transcription.





Justifications for Admission


Other Justification














LEBRON MOORE MD                 Jan 8, 2022 15:43

## 2022-01-08 NOTE — PDOC
PULMONARY PROGRESS NOTES


DATE: 1/8/22 


TIME: 06:56


Subjective


Patient remains on 10 L nasal cannula.


 feels better


Vitals





Vital Signs








  Date Time  Temp Pulse Resp B/P (MAP) Pulse Ox O2 Delivery O2 Flow Rate FiO2


 


1/8/22 03:00 98.1 82 18 155/83 (107) 93   





 98.1       


 


1/7/22 20:00      Nasal Cannula 10.0 








Comments


Patient seen during the COVID-19 viral pandemic, visual inspection 


no distress 


rrr


not utilizing accessory muscles no paroxysmal breathing pattern no rashes


Labs





Laboratory Tests








Test


 1/6/22


08:16 1/6/22


12:19 1/6/22


17:28 1/6/22


19:48


 


Glucose (Fingerstick)


 87 mg/dL


(70-99) 103 mg/dL


(70-99) 322 mg/dL


(70-99) 393 mg/dL


(70-99)


 


Test


 1/7/22


06:30 1/7/22


08:15 1/7/22


12:07 1/7/22


17:06


 


White Blood Count


 12.0 x10^3/uL


(4.0-11.0) 


 


 





 


Red Blood Count


 4.75 x10^6/uL


(4.30-5.70) 


 


 





 


Hemoglobin


 13.3 g/dL


(13.0-17.5) 


 


 





 


Hematocrit


 39.4 %


(39.0-53.0) 


 


 





 


Mean Corpuscular Volume 83 fL ()    


 


Mean Corpuscular Hemoglobin 28 pg (25-35)    


 


Mean Corpuscular Hemoglobin


Concent 34 g/dL


(31-37) 


 


 





 


Red Cell Distribution Width


 15.2 %


(11.5-14.5) 


 


 





 


Platelet Count


 414 x10^3/uL


(140-400) 


 


 





 


Sodium Level


 139 mmol/L


(136-145) 


 


 





 


Potassium Level


 3.8 mmol/L


(3.5-5.1) 


 


 





 


Chloride Level


 107 mmol/L


() 


 


 





 


Carbon Dioxide Level


 27 mmol/L


(21-32) 


 


 





 


Anion Gap 5 (6-14)    


 


Blood Urea Nitrogen


 24 mg/dL


(8-26) 


 


 





 


Creatinine


 0.8 mg/dL


(0.7-1.3) 


 


 





 


Estimated GFR


(Cockcroft-Gault) 97.6 


 


 


 





 


Glucose Level


 97 mg/dL


(70-99) 


 


 





 


Calcium Level


 7.4 mg/dL


(8.5-10.1) 


 


 





 


Glucose (Fingerstick)


 


 83 mg/dL


(70-99) 218 mg/dL


(70-99) 265 mg/dL


(70-99)


 


Test


 1/7/22


21:34 


 


 





 


Glucose (Fingerstick)


 289 mg/dL


(70-99) 


 


 











Laboratory Tests








Test


 1/7/22


08:15 1/7/22


12:07 1/7/22


17:06 1/7/22


21:34


 


Glucose (Fingerstick)


 83 mg/dL


(70-99) 218 mg/dL


(70-99) 265 mg/dL


(70-99) 289 mg/dL


(70-99)








Medications





Active Scripts








 Medications  Dose


 Route/Sig


 Max Daily Dose Days Date Category Dose


Instructions


 


 Azithromycin


 Tablet


  (Azithromycin)


 250 Mg Tablet  1 Pkg


 PO UD


  5 12/29/21 Rx  2 the first day followed by 1 for days 2-5


 


 Hydrocodone-Apap


 5-325  **


  (Hydrocodone


 Bit/Acetaminophen)


 1 Tab Tablet  1 Tab


 PO PRN Q6HRS PRN


  3 5/28/21 Rx 


 


 Keflex


  (Cephalexin) 500


 Mg Capsule  1 Cap


 PO TID


  10 6/28/20 Rx 


 


 Norco 5-325


 Tablet


  (Acetaminophen/Hydrocodone


 Bitart) 1 Each


 Tablet  1 Tab


 PO PRN Q6HRS PRN


   4/6/20 Rx 


 


 Keflex


  (Cephalexin) 500


 Mg Capsule  1 Cap


 PO TID


  7 12/4/19 Rx 


 


 Peridex


  (Chlorhexidine


 Gluconate) 15 Ml


 Mouthwash  15 Ml


 SWSP BID


  7 12/4/19 Rx  Brush teeth before using to prevent staining. Swish for


 approximately 30 seconds before spitting.


 


 Naproxen 500 Mg


 Tablet  1 Tab


 PO BID PRN


  10 12/4/19 Rx 


 


 Clopidogrel


  (Clopidogrel


 Bisulfate) 75 Mg


 Tablet  75 Mg


 PO DAILYWBKFT


  30 6/24/19 Rx 


 


 Viagra


  (Sildenafil


 Citrate) 100 Mg


 Tablet  1 Tab


 PO PRN DAILY


   6/24/19 Reported 


 


 Nystatin 15 Gm


 Powder  1 Qasim


 TP BID


   6/24/19 Reported 


 


 Mupirocin


 Ointment


  (Mupirocin) 22 Gm


 Oint...g.  1 Qasim


 TP DAILY


   6/24/19 Reported 


 


 Lisinopril 20 Mg


 Tablet  1 Tab


 PO DAILY


   6/24/19 Reported 


 


 Gabapentin 800 Mg


 Tablet  800 Mg


 PO Q8HRS


   6/24/19 Reported 


 


 Atorvastatin


 Calcium 80 Mg


 Tablet  1 Tab


 PO DAILY


   6/24/19 Reported 


 


 Janumet 50-1,000


 Mg Tablet


  (Sitagliptin


 Phos/Metformin


 Hcl) 1 Each Tablet  1 Tab


 PO BID


   8/30/18 Reported 


 


 Pentoxifylline


 400 Mg Tablet.er  400 Mg


 PO TID


   8/30/18 Reported 


 


 Cymbalta


  (Duloxetine Hcl)


 30 Mg Capsule.dr  30 Mg


 PO QHS


   8/30/18 Reported 


 


 Glipizide 10 Mg


 Tablet  1 Tab


 PO BID


   8/30/18 Reported 


 


 Aspirin 81 Mg


 Tab.chew  1 Tab


 PO DAILY


   10/27/15 Reported 











Impression


.


IMPRESSION:


1.  Acute hypoxic respiratory failure secondary to COVID-19 viral 

pneumonia.//Acute exacerbation of COPD.


2.  COVID-19 viral pneumonia, acute respiratory distress syndrome.


3.  Coronary artery disease, status post coronary artery bypass grafting.


4.  Diabetes.





Plan


.





Continue oxygen supplementation 


titrate FiO2 to keep saturations 90% 


Status post 5 days of remdesivir


Dexamethasone for total of 10 days.


Empiric antibiotics  total of 7 d


DVT prophylaxis


Monitor blood sugars


Clinically stable.


Continue present supportive care.





discussed w AIDEE Lubin MD                Jan 8, 2022 06:57

## 2022-01-09 VITALS — SYSTOLIC BLOOD PRESSURE: 130 MMHG | DIASTOLIC BLOOD PRESSURE: 70 MMHG

## 2022-01-09 VITALS — DIASTOLIC BLOOD PRESSURE: 54 MMHG | SYSTOLIC BLOOD PRESSURE: 102 MMHG

## 2022-01-09 VITALS — SYSTOLIC BLOOD PRESSURE: 147 MMHG | DIASTOLIC BLOOD PRESSURE: 76 MMHG

## 2022-01-09 VITALS — DIASTOLIC BLOOD PRESSURE: 69 MMHG | SYSTOLIC BLOOD PRESSURE: 129 MMHG

## 2022-01-09 VITALS — SYSTOLIC BLOOD PRESSURE: 146 MMHG | DIASTOLIC BLOOD PRESSURE: 82 MMHG

## 2022-01-09 VITALS — DIASTOLIC BLOOD PRESSURE: 77 MMHG | SYSTOLIC BLOOD PRESSURE: 128 MMHG

## 2022-01-09 LAB
ALBUMIN SERPL-MCNC: 1.9 G/DL (ref 3.4–5)
ALBUMIN/GLOB SERPL: 0.5 {RATIO} (ref 1–1.7)
ALP SERPL-CCNC: 131 U/L (ref 46–116)
ALT SERPL-CCNC: 22 U/L (ref 16–63)
ANION GAP SERPL CALC-SCNC: 8 MMOL/L (ref 6–14)
AST SERPL-CCNC: 17 U/L (ref 15–37)
BASOPHILS # BLD AUTO: 0 X10^3/UL (ref 0–0.2)
BASOPHILS NFR BLD: 0 % (ref 0–3)
BILIRUB SERPL-MCNC: 0.5 MG/DL (ref 0.2–1)
BUN SERPL-MCNC: 31 MG/DL (ref 8–26)
BUN/CREAT SERPL: 34 (ref 6–20)
CALCIUM SERPL-MCNC: 7.6 MG/DL (ref 8.5–10.1)
CHLORIDE SERPL-SCNC: 107 MMOL/L (ref 98–107)
CO2 SERPL-SCNC: 26 MMOL/L (ref 21–32)
CREAT SERPL-MCNC: 0.9 MG/DL (ref 0.7–1.3)
EOSINOPHIL NFR BLD: 0 % (ref 0–3)
EOSINOPHIL NFR BLD: 0 X10^3/UL (ref 0–0.7)
ERYTHROCYTE [DISTWIDTH] IN BLOOD BY AUTOMATED COUNT: 15.2 % (ref 11.5–14.5)
GFR SERPLBLD BASED ON 1.73 SQ M-ARVRAT: 85.2 ML/MIN
GLUCOSE SERPL-MCNC: 111 MG/DL (ref 70–99)
HCT VFR BLD CALC: 41.3 % (ref 39–53)
HGB BLD-MCNC: 13.3 G/DL (ref 13–17.5)
LYMPHOCYTES # BLD: 1.1 X10^3/UL (ref 1–4.8)
LYMPHOCYTES NFR BLD AUTO: 10 % (ref 24–48)
MCH RBC QN AUTO: 27 PG (ref 25–35)
MCHC RBC AUTO-ENTMCNC: 32 G/DL (ref 31–37)
MCV RBC AUTO: 84 FL (ref 79–100)
MONO #: 1 X10^3/UL (ref 0–1.1)
MONOCYTES NFR BLD: 9 % (ref 0–9)
NEUT #: 9.6 X10^3/UL (ref 1.8–7.7)
NEUTROPHILS NFR BLD AUTO: 82 % (ref 31–73)
PLATELET # BLD AUTO: 498 X10^3/UL (ref 140–400)
POTASSIUM SERPL-SCNC: 4.5 MMOL/L (ref 3.5–5.1)
PROT SERPL-MCNC: 5.6 G/DL (ref 6.4–8.2)
RBC # BLD AUTO: 4.92 X10^6/UL (ref 4.3–5.7)
SODIUM SERPL-SCNC: 141 MMOL/L (ref 136–145)
WBC # BLD AUTO: 11.7 X10^3/UL (ref 4–11)

## 2022-01-09 RX ADMIN — GABAPENTIN SCH MG: 400 CAPSULE ORAL at 06:10

## 2022-01-09 RX ADMIN — INSULIN LISPRO SCH UNITS: 100 INJECTION, SOLUTION INTRAVENOUS; SUBCUTANEOUS at 12:00

## 2022-01-09 RX ADMIN — BENZONATATE SCH MG: 100 CAPSULE, LIQUID FILLED ORAL at 21:26

## 2022-01-09 RX ADMIN — INSULIN LISPRO SCH UNITS: 100 INJECTION, SOLUTION INTRAVENOUS; SUBCUTANEOUS at 08:00

## 2022-01-09 RX ADMIN — CHLORHEXIDINE GLUCONATE SCH ML: 1.2 RINSE ORAL at 08:42

## 2022-01-09 RX ADMIN — INSULIN LISPRO SCH UNITS: 100 INJECTION, SOLUTION INTRAVENOUS; SUBCUTANEOUS at 19:09

## 2022-01-09 RX ADMIN — DOXYCYCLINE HYCLATE SCH MG: 100 TABLET, COATED ORAL at 21:26

## 2022-01-09 RX ADMIN — INSULIN LISPRO SCH UNITS: 100 INJECTION, SOLUTION INTRAVENOUS; SUBCUTANEOUS at 19:08

## 2022-01-09 RX ADMIN — INSULIN LISPRO SCH UNITS: 100 INJECTION, SOLUTION INTRAVENOUS; SUBCUTANEOUS at 07:30

## 2022-01-09 RX ADMIN — Medication SCH CAP: at 21:26

## 2022-01-09 RX ADMIN — ASPIRIN 81 MG SCH MG: 81 TABLET ORAL at 10:32

## 2022-01-09 RX ADMIN — LISINOPRIL SCH MG: 20 TABLET ORAL at 10:33

## 2022-01-09 RX ADMIN — ZINC SULFATE CAP 220 MG (50 MG ELEMENTAL ZN) SCH MG: 220 (50 ZN) CAP at 10:32

## 2022-01-09 RX ADMIN — BENZONATATE SCH MG: 100 CAPSULE, LIQUID FILLED ORAL at 10:33

## 2022-01-09 RX ADMIN — CLOPIDOGREL BISULFATE SCH MG: 75 TABLET ORAL at 10:30

## 2022-01-09 RX ADMIN — Medication SCH MG: at 10:33

## 2022-01-09 RX ADMIN — INSULIN GLARGINE SCH UNIT: 100 INJECTION, SOLUTION SUBCUTANEOUS at 21:27

## 2022-01-09 RX ADMIN — GABAPENTIN SCH MG: 400 CAPSULE ORAL at 10:30

## 2022-01-09 RX ADMIN — INSULIN LISPRO SCH UNITS: 100 INJECTION, SOLUTION INTRAVENOUS; SUBCUTANEOUS at 14:39

## 2022-01-09 RX ADMIN — GABAPENTIN SCH MG: 400 CAPSULE ORAL at 21:26

## 2022-01-09 RX ADMIN — CHLORHEXIDINE GLUCONATE SCH ML: 1.2 RINSE ORAL at 21:26

## 2022-01-09 RX ADMIN — Medication SCH CAP: at 10:32

## 2022-01-09 RX ADMIN — ENOXAPARIN SODIUM SCH MG: 40 INJECTION SUBCUTANEOUS at 10:34

## 2022-01-09 RX ADMIN — DOXYCYCLINE HYCLATE SCH MG: 100 TABLET, COATED ORAL at 10:33

## 2022-01-09 RX ADMIN — DEXAMETHASONE SODIUM PHOSPHATE SCH MG: 4 INJECTION, SOLUTION INTRAMUSCULAR; INTRAVENOUS at 10:30

## 2022-01-09 RX ADMIN — GUAIFENESIN AND CODEINE PHOSPHATE PRN ML: 10; 100 LIQUID ORAL at 10:34

## 2022-01-09 RX ADMIN — BENZONATATE SCH MG: 100 CAPSULE, LIQUID FILLED ORAL at 14:34

## 2022-01-09 NOTE — PDOC
GENERAL


General:


Discharge summary [id number]





VITAL SIGNS


Vital Signs/I&O:





                                   Vital Signs








  Date Time  Temp Pulse Resp B/P (MAP) Pulse Ox O2 Delivery O2 Flow Rate FiO2


 


1/9/22 10:39  74  147/76    


 


1/9/22 07:00 98.2  18  91 Nasal Cannula 10.0 





 98.2       














                                    I & O   


 


 1/8/22 1/8/22 1/9/22





 15:00 23:00 07:00


 


Intake Total 400 ml  240 ml


 


Output Total  400 ml 


 


Balance 400 ml -400 ml 240 ml











ALLERGIES


Allergies:





Allergies








Coded Allergies Type Severity Reaction Last Updated Verified


 


  No Known Drug Allergies    9/24/18 No











MEDS


Medications:





Current Medications








 Medications


  (Trade)  Dose


 Ordered  Sig/Makenzie


 Route


 PRN Reason  Start Time


 Stop Time Status Last Admin


Dose Admin


 


 Insulin Glargine


  (Lantus Syringe)  40 unit  HS


 SQ


   1/8/22 21:00


    1/8/22 22:04














LAB


Lab:





                                Laboratory Tests








Test


 1/8/22


16:39 1/8/22


21:25 1/9/22


06:50


 


Glucose (Fingerstick)


 362 mg/dL


(70-99)  H 256 mg/dL


(70-99)  H 





 


White Blood Count


 


 


 11.7 x10^3/uL


(4.0-11.0)  H


 


Red Blood Count


 


 


 4.92 x10^6/uL


(4.30-5.70)


 


Hemoglobin


 


 


 13.3 g/dL


(13.0-17.5)


 


Hematocrit


 


 


 41.3 %


(39.0-53.0)


 


Mean Corpuscular Volume


 


 


 84 fL ()





 


Mean Corpuscular Hemoglobin   27 pg (25-35)  


 


Mean Corpuscular Hemoglobin


Concent 


 


 32 g/dL


(31-37)


 


Red Cell Distribution Width


 


 


 15.2 %


(11.5-14.5)  H


 


Platelet Count


 


 


 498 x10^3/uL


(140-400)  H


 


Neutrophils (%) (Auto)   82 % (31-73)  H


 


Lymphocytes (%) (Auto)   10 % (24-48)  L


 


Monocytes (%) (Auto)   9 % (0-9)  


 


Eosinophils (%) (Auto)   0 % (0-3)  


 


Basophils (%) (Auto)   0 % (0-3)  


 


Neutrophils # (Auto)


 


 


 9.6 x10^3/uL


(1.8-7.7)  H


 


Lymphocytes # (Auto)


 


 


 1.1 x10^3/uL


(1.0-4.8)


 


Monocytes # (Auto)


 


 


 1.0 x10^3/uL


(0.0-1.1)


 


Eosinophils # (Auto)


 


 


 0.0 x10^3/uL


(0.0-0.7)


 


Basophils # (Auto)


 


 


 0.0 x10^3/uL


(0.0-0.2)


 


Sodium Level


 


 


 141 mmol/L


(136-145)


 


Potassium Level


 


 


 4.5 mmol/L


(3.5-5.1)


 


Chloride Level


 


 


 107 mmol/L


()


 


Carbon Dioxide Level


 


 


 26 mmol/L


(21-32)


 


Anion Gap   8 (6-14)  


 


Blood Urea Nitrogen


 


 


 31 mg/dL


(8-26)  H


 


Creatinine


 


 


 0.9 mg/dL


(0.7-1.3)


 


Estimated GFR


(Cockcroft-Gault) 


 


 85.2  





 


BUN/Creatinine Ratio   34 (6-20)  H


 


Glucose Level


 


 


 111 mg/dL


(70-99)  H


 


Calcium Level


 


 


 7.6 mg/dL


(8.5-10.1)  L


 


Total Bilirubin


 


 


 0.5 mg/dL


(0.2-1.0)


 


Aspartate Amino Transferase


(AST) 


 


 17 U/L (15-37)





 


Alanine Aminotransferase (ALT)


 


 


 22 U/L (16-63)





 


Alkaline Phosphatase


 


 


 131 U/L


()  H


 


Total Protein


 


 


 5.6 g/dL


(6.4-8.2)  L


 


Albumin


 


 


 1.9 g/dL


(3.4-5.0)  L


 


Albumin/Globulin Ratio


 


 


 0.5 (1.0-1.7)


L





                                Laboratory Tests


1/9/22 06:50








                                Laboratory Tests


1/9/22 06:50











Justifications for Admission


Other Justification














LEBRON MOORE MD                 Jan 9, 2022 13:28

## 2022-01-09 NOTE — DS
DATE OF DISCHARGE: 01/09/2022

HOSPITAL COURSE:  This patient is a 61-year-old man who is on hospital day 10, 

admitted with acute hypoxic respiratory failure secondary to COVID pneumonia.  

He completed 5 days of remdesivir and 10 days of steroids.  He was also covered 

with empiric antibiotics.  He required over 5 liters of oxygen continuously 

throughout his stay, but today is down to 2 liters of oxygen and is greatly 

desirous of discharge home.  We are asking for a 6-minute walk test and getting 

him ready for discharge.  He had his oral anti-glycemics held in the hospital 

and given the steroid use, his sugars were quite high.  He would rather 

discharge home back on the oral anti-glycemics and follow up with his primary 

care team.  We will attempt to do that.



PHYSICAL EXAMINATION:

VITAL SIGNS:  Notable for that the patient has been afebrile.  Blood pressure 

has been in the 130s-140s/80s, heart rate is in 80s-90s.  He is breathing 

comfortably and saturating 95% between 2-4 liters today.

GENERAL:  He is a pleasant 61-year-old man, alert and oriented x 3, in no acute 

distress.

HEENT:  Unremarkable for acute abnormality.

CHEST:  Bilateral equal air entry, though diminished throughout.  No crackles or

wheezes are noted.

HEART:  S1, S2 normal.  Regular rate and rhythm.  No murmurs or gallops are 

noted.

ABDOMEN:  Soft, nontender, nondistended.  No masses or organomegaly noted.

EXTREMITIES:  Unremarkable for acute abnormality.



Greater than 30 minutes were spent on coordinating this discharge with greater 

than 50% in counseling and coordination of care, most of which in discussion 

with the patient and nursing regarding his care plan and progress.



FINAL DIAGNOSIS:  Acute hypoxic respiratory failure secondary to COVID 

pneumonia.







ACE/TUNDE BONILLA: ACE/andrea   DD: 01/09/2022 13:28

DT: 01/09/2022 14:27   TID: [id number]

## 2022-01-09 NOTE — NUR
Went into pts room to titrate O2 down from 10L and pt had NC off and on floor. Pts O2 was 
reading 90% on RA. Pt then stated "I need, I need" and put the NC back on. Titrated O2 to 6L 
NC.

## 2022-01-09 NOTE — PDOC
PULMONARY PROGRESS NOTES


DATE: 1/9/22 


TIME: 07:06


Subjective


on 10 L nasal cannula.  fio2 titrated down to 6 lpm  but the patient increased 

it to 10 


 feels better


Vitals





Vital Signs








  Date Time  Temp Pulse Resp B/P (MAP) Pulse Ox O2 Delivery O2 Flow Rate FiO2


 


1/9/22 03:00 97.8 80 18 146/82 (103) 95   





 97.8       


 


1/8/22 20:00      Nasal Cannula 10.0 








Comments


Patient seen during the COVID-19 viral pandemic, visual inspection 


no distress 


rrr


not utilizing accessory muscles no paroxysmal breathing pattern no rashes


Labs





Laboratory Tests








Test


 1/7/22


08:15 1/7/22


12:07 1/7/22


17:06 1/7/22


21:34


 


Glucose (Fingerstick)


 83 mg/dL


(70-99) 218 mg/dL


(70-99) 265 mg/dL


(70-99) 289 mg/dL


(70-99)


 


Test


 1/8/22


08:07 1/8/22


11:29 1/8/22


16:39 1/8/22


21:25


 


Glucose (Fingerstick)


 185 mg/dL


(70-99) 226 mg/dL


(70-99) 362 mg/dL


(70-99) 256 mg/dL


(70-99)








Laboratory Tests








Test


 1/8/22


08:07 1/8/22


11:29 1/8/22


16:39 1/8/22


21:25


 


Glucose (Fingerstick)


 185 mg/dL


(70-99) 226 mg/dL


(70-99) 362 mg/dL


(70-99) 256 mg/dL


(70-99)








Medications





Active Scripts








 Medications  Dose


 Route/Sig


 Max Daily Dose Days Date Category Dose


Instructions


 


 Azithromycin


 Tablet


  (Azithromycin)


 250 Mg Tablet  1 Pkg


 PO UD


  5 12/29/21 Rx  2 the first day followed by 1 for days 2-5


 


 Hydrocodone-Apap


 5-325  **


  (Hydrocodone


 Bit/Acetaminophen)


 1 Tab Tablet  1 Tab


 PO PRN Q6HRS PRN


  3 5/28/21 Rx 


 


 Keflex


  (Cephalexin) 500


 Mg Capsule  1 Cap


 PO TID


  10 6/28/20 Rx 


 


 Norco 5-325


 Tablet


  (Acetaminophen/Hydrocodone


 Bitart) 1 Each


 Tablet  1 Tab


 PO PRN Q6HRS PRN


   4/6/20 Rx 


 


 Keflex


  (Cephalexin) 500


 Mg Capsule  1 Cap


 PO TID


  7 12/4/19 Rx 


 


 Peridex


  (Chlorhexidine


 Gluconate) 15 Ml


 Mouthwash  15 Ml


 SWSP BID


  7 12/4/19 Rx  Brush teeth before using to prevent staining. Swish for


 approximately 30 seconds before spitting.


 


 Naproxen 500 Mg


 Tablet  1 Tab


 PO BID PRN


  10 12/4/19 Rx 


 


 Clopidogrel


  (Clopidogrel


 Bisulfate) 75 Mg


 Tablet  75 Mg


 PO DAILYWBKFT


  30 6/24/19 Rx 


 


 Viagra


  (Sildenafil


 Citrate) 100 Mg


 Tablet  1 Tab


 PO PRN DAILY


   6/24/19 Reported 


 


 Nystatin 15 Gm


 Powder  1 Qasim


 TP BID


   6/24/19 Reported 


 


 Mupirocin


 Ointment


  (Mupirocin) 22 Gm


 Oint...g.  1 Qasim


 TP DAILY


   6/24/19 Reported 


 


 Lisinopril 20 Mg


 Tablet  1 Tab


 PO DAILY


   6/24/19 Reported 


 


 Gabapentin 800 Mg


 Tablet  800 Mg


 PO Q8HRS


   6/24/19 Reported 


 


 Atorvastatin


 Calcium 80 Mg


 Tablet  1 Tab


 PO DAILY


   6/24/19 Reported 


 


 Janumet 50-1,000


 Mg Tablet


  (Sitagliptin


 Phos/Metformin


 Hcl) 1 Each Tablet  1 Tab


 PO BID


   8/30/18 Reported 


 


 Pentoxifylline


 400 Mg Tablet.er  400 Mg


 PO TID


   8/30/18 Reported 


 


 Cymbalta


  (Duloxetine Hcl)


 30 Mg Capsule.dr  30 Mg


 PO QHS


   8/30/18 Reported 


 


 Glipizide 10 Mg


 Tablet  1 Tab


 PO BID


   8/30/18 Reported 


 


 Aspirin 81 Mg


 Tab.chew  1 Tab


 PO DAILY


   10/27/15 Reported 











Impression


.


IMPRESSION:


1.  Acute hypoxic respiratory failure secondary to COVID-19 viral 

pneumonia.//Acute exacerbation of COPD.


2.  COVID-19 viral pneumonia, acute respiratory distress syndrome.


3.  Coronary artery disease, status post coronary artery bypass grafting.


4.  Diabetes.





Plan


.








titrate FiO2 to keep saturations 90% 


Status post 5 days of remdesivir


Dexamethasone for total of 10 days.


Empiric antibiotics  total of 7 d


DVT prophylaxis


Monitor blood sugars


Clinically stable.


Continue present supportive care.





discussed w AIDEE Lubin MD                Jan 9, 2022 07:07

## 2022-01-10 VITALS — DIASTOLIC BLOOD PRESSURE: 74 MMHG | SYSTOLIC BLOOD PRESSURE: 140 MMHG

## 2022-01-10 VITALS — SYSTOLIC BLOOD PRESSURE: 136 MMHG | DIASTOLIC BLOOD PRESSURE: 74 MMHG

## 2022-01-10 VITALS — SYSTOLIC BLOOD PRESSURE: 131 MMHG | DIASTOLIC BLOOD PRESSURE: 73 MMHG

## 2022-01-10 RX ADMIN — LISINOPRIL SCH MG: 20 TABLET ORAL at 08:41

## 2022-01-10 RX ADMIN — INSULIN LISPRO SCH UNITS: 100 INJECTION, SOLUTION INTRAVENOUS; SUBCUTANEOUS at 11:30

## 2022-01-10 RX ADMIN — CLOPIDOGREL BISULFATE SCH MG: 75 TABLET ORAL at 08:41

## 2022-01-10 RX ADMIN — ENOXAPARIN SODIUM SCH MG: 40 INJECTION SUBCUTANEOUS at 11:00

## 2022-01-10 RX ADMIN — GABAPENTIN SCH MG: 400 CAPSULE ORAL at 05:58

## 2022-01-10 RX ADMIN — ZINC SULFATE CAP 220 MG (50 MG ELEMENTAL ZN) SCH MG: 220 (50 ZN) CAP at 08:42

## 2022-01-10 RX ADMIN — ASPIRIN 81 MG SCH MG: 81 TABLET ORAL at 08:41

## 2022-01-10 RX ADMIN — CHLORHEXIDINE GLUCONATE SCH ML: 1.2 RINSE ORAL at 08:40

## 2022-01-10 RX ADMIN — BENZONATATE SCH MG: 100 CAPSULE, LIQUID FILLED ORAL at 08:41

## 2022-01-10 RX ADMIN — INSULIN LISPRO SCH UNITS: 100 INJECTION, SOLUTION INTRAVENOUS; SUBCUTANEOUS at 07:30

## 2022-01-10 RX ADMIN — INSULIN LISPRO SCH UNITS: 100 INJECTION, SOLUTION INTRAVENOUS; SUBCUTANEOUS at 08:00

## 2022-01-10 RX ADMIN — Medication SCH MG: at 09:42

## 2022-01-10 RX ADMIN — Medication SCH CAP: at 08:41

## 2022-01-10 RX ADMIN — DEXAMETHASONE SODIUM PHOSPHATE SCH MG: 4 INJECTION, SOLUTION INTRAMUSCULAR; INTRAVENOUS at 08:41

## 2022-01-10 RX ADMIN — DOXYCYCLINE HYCLATE SCH MG: 100 TABLET, COATED ORAL at 08:41

## 2022-01-10 RX ADMIN — INSULIN LISPRO SCH UNITS: 100 INJECTION, SOLUTION INTRAVENOUS; SUBCUTANEOUS at 12:00

## 2022-01-10 NOTE — NUR
Discharge Note:



[facility] [place]



Discharge instructions and discharge home medications reviewed with Patient and a copy 
given. All questions have been answered and understanding verbalized. 



The following instructions and handouts were given: Oxygen use and Covid-19 recovery



Discontinued lines and drains: Right AC IV discontinued. Tip intact. Pressure and dressing 
applied. Patient tolerated well. 



Patient discharged to Home with wife.

## 2022-01-10 NOTE — PDOC
PULMONARY PROGRESS NOTES


DATE: 1/10/22 


TIME: 08:15


Subjective


Patient feels better, no more short of breath


Vitals





Vital Signs








  Date Time  Temp Pulse Resp B/P (MAP) Pulse Ox O2 Delivery O2 Flow Rate FiO2


 


1/10/22 03:00 98.1 77 16 131/73 (92) 92   





 98.1       


 


1/9/22 20:00      Nasal Cannula 2.0 








Comments


Patient seen during the COVID-19 viral pandemic, visual inspection 


no distress 


rrr


not utilizing accessory muscles no paroxysmal breathing pattern no rashes


Labs





Laboratory Tests








Test


 1/8/22


11:29 1/8/22


16:39 1/8/22


21:25 1/9/22


06:50


 


Glucose (Fingerstick)


 226 mg/dL


(70-99) 362 mg/dL


(70-99) 256 mg/dL


(70-99) 





 


White Blood Count


 


 


 


 11.7 x10^3/uL


(4.0-11.0)


 


Red Blood Count


 


 


 


 4.92 x10^6/uL


(4.30-5.70)


 


Hemoglobin


 


 


 


 13.3 g/dL


(13.0-17.5)


 


Hematocrit


 


 


 


 41.3 %


(39.0-53.0)


 


Mean Corpuscular Volume    84 fL () 


 


Mean Corpuscular Hemoglobin    27 pg (25-35) 


 


Mean Corpuscular Hemoglobin


Concent 


 


 


 32 g/dL


(31-37)


 


Red Cell Distribution Width


 


 


 


 15.2 %


(11.5-14.5)


 


Platelet Count


 


 


 


 498 x10^3/uL


(140-400)


 


Neutrophils (%) (Auto)    82 % (31-73) 


 


Lymphocytes (%) (Auto)    10 % (24-48) 


 


Monocytes (%) (Auto)    9 % (0-9) 


 


Eosinophils (%) (Auto)    0 % (0-3) 


 


Basophils (%) (Auto)    0 % (0-3) 


 


Neutrophils # (Auto)


 


 


 


 9.6 x10^3/uL


(1.8-7.7)


 


Lymphocytes # (Auto)


 


 


 


 1.1 x10^3/uL


(1.0-4.8)


 


Monocytes # (Auto)


 


 


 


 1.0 x10^3/uL


(0.0-1.1)


 


Eosinophils # (Auto)


 


 


 


 0.0 x10^3/uL


(0.0-0.7)


 


Basophils # (Auto)


 


 


 


 0.0 x10^3/uL


(0.0-0.2)


 


Sodium Level


 


 


 


 141 mmol/L


(136-145)


 


Potassium Level


 


 


 


 4.5 mmol/L


(3.5-5.1)


 


Chloride Level


 


 


 


 107 mmol/L


()


 


Carbon Dioxide Level


 


 


 


 26 mmol/L


(21-32)


 


Anion Gap    8 (6-14) 


 


Blood Urea Nitrogen


 


 


 


 31 mg/dL


(8-26)


 


Creatinine


 


 


 


 0.9 mg/dL


(0.7-1.3)


 


Estimated GFR


(Cockcroft-Gault) 


 


 


 85.2 





 


BUN/Creatinine Ratio    34 (6-20) 


 


Glucose Level


 


 


 


 111 mg/dL


(70-99)


 


Calcium Level


 


 


 


 7.6 mg/dL


(8.5-10.1)


 


Total Bilirubin


 


 


 


 0.5 mg/dL


(0.2-1.0)


 


Aspartate Amino Transf


(AST/SGOT) 


 


 


 17 U/L (15-37) 





 


Alanine Aminotransferase


(ALT/SGPT) 


 


 


 22 U/L (16-63) 





 


Alkaline Phosphatase


 


 


 


 131 U/L


()


 


Total Protein


 


 


 


 5.6 g/dL


(6.4-8.2)


 


Albumin


 


 


 


 1.9 g/dL


(3.4-5.0)


 


Albumin/Globulin Ratio    0.5 (1.0-1.7) 


 


Test


 1/9/22


13:59 1/9/22


17:33 1/9/22


21:43 1/10/22


07:38


 


Glucose (Fingerstick)


 251 mg/dL


(70-99) 314 mg/dL


(70-99) 288 mg/dL


(70-99) 133 mg/dL


(70-99)








Laboratory Tests








Test


 1/9/22


13:59 1/9/22


17:33 1/9/22


21:43 1/10/22


07:38


 


Glucose (Fingerstick)


 251 mg/dL


(70-99) 314 mg/dL


(70-99) 288 mg/dL


(70-99) 133 mg/dL


(70-99)








Medications





Active Scripts








 Medications  Dose


 Route/Sig


 Max Daily Dose Days Date Category Dose


Instructions


 


 Azithromycin


 Tablet


  (Azithromycin)


 250 Mg Tablet  1 Pkg


 PO UD


  5 12/29/21 Rx  2 the first day followed by 1 for days 2-5


 


 Hydrocodone-Apap


 5-325  **


  (Hydrocodone


 Bit/Acetaminophen)


 1 Tab Tablet  1 Tab


 PO PRN Q6HRS PRN


  3 5/28/21 Rx 


 


 Keflex


  (Cephalexin) 500


 Mg Capsule  1 Cap


 PO TID


  10 6/28/20 Rx 


 


 Norco 5-325


 Tablet


  (Acetaminophen/Hydrocodone


 Bitart) 1 Each


 Tablet  1 Tab


 PO PRN Q6HRS PRN


   4/6/20 Rx 


 


 Keflex


  (Cephalexin) 500


 Mg Capsule  1 Cap


 PO TID


  7 12/4/19 Rx 


 


 Peridex


  (Chlorhexidine


 Gluconate) 15 Ml


 Mouthwash  15 Ml


 SWSP BID


  7 12/4/19 Rx  Brush teeth before using to prevent staining. Swish for


 approximately 30 seconds before spitting.


 


 Naproxen 500 Mg


 Tablet  1 Tab


 PO BID PRN


  10 12/4/19 Rx 


 


 Clopidogrel


  (Clopidogrel


 Bisulfate) 75 Mg


 Tablet  75 Mg


 PO DAILYWBKFT


  30 6/24/19 Rx 


 


 Viagra


  (Sildenafil


 Citrate) 100 Mg


 Tablet  1 Tab


 PO PRN DAILY


   6/24/19 Reported 


 


 Nystatin 15 Gm


 Powder  1 Qasim


 TP BID


   6/24/19 Reported 


 


 Mupirocin


 Ointment


  (Mupirocin) 22 Gm


 Oint...g.  1 Qasim


 TP DAILY


   6/24/19 Reported 


 


 Lisinopril 20 Mg


 Tablet  1 Tab


 PO DAILY


   6/24/19 Reported 


 


 Gabapentin 800 Mg


 Tablet  800 Mg


 PO Q8HRS


   6/24/19 Reported 


 


 Atorvastatin


 Calcium 80 Mg


 Tablet  1 Tab


 PO DAILY


   6/24/19 Reported 


 


 Janumet 50-1,000


 Mg Tablet


  (Sitagliptin


 Phos/Metformin


 Hcl) 1 Each Tablet  1 Tab


 PO BID


   8/30/18 Reported 


 


 Pentoxifylline


 400 Mg Tablet.er  400 Mg


 PO TID


   8/30/18 Reported 


 


 Cymbalta


  (Duloxetine Hcl)


 30 Mg Capsule.dr  30 Mg


 PO QHS


   8/30/18 Reported 


 


 Glipizide 10 Mg


 Tablet  1 Tab


 PO BID


   8/30/18 Reported 


 


 Aspirin 81 Mg


 Tab.chew  1 Tab


 PO DAILY


   10/27/15 Reported 











Impression


.


IMPRESSION:


1.  Acute hypoxic respiratory failure secondary to COVID-19 viral 

pneumonia.//Acute exacerbation of COPD.


2.  COVID-19 viral pneumonia, acute respiratory distress syndrome.


3.  Coronary artery disease, status post coronary artery bypass grafting.


4.  Diabetes.





Plan


.


Better, discharged home with oxygen


Follow-up in the office in 4 weeks











RAMBO ESPINOZA MD              Thierry 10, 2022 08:15

## 2022-01-10 NOTE — PDOC
[facility] PROGRESS NOTE


Date of Service


DOS:


DATE: 1/10/22 


TIME: 11:53





Chief Complaint


Chief Complaint


acute hypoxic respiratory failure


COVID+ pneumonia


DM2, acute hyperglycemia


acute weakness


diarrhea





History of Present Illness


History of Present Illness


1/10


Seen examined at bedside.  Is post discharge yesterday but was unable to due to 

poor 6-minute walk results.  Notably improved this morning.  Discharge today 4 L

with exertion.





1/7/2022


Patient seen and examined


On the commode off his oxygen currently but is supposed to be on 10 L


Discussed with RN


Chart reviewed














01/06/2022


Pt seen and examined 


Discussed with RN


Chart reviewed 


Pt resting in bed, requires 12L O2 today 


Continue COVID protocol


Added Norsvac due to high BP overnight 


Decreased Lantus to 1x daily bc patient not eating well, withheld humalog this 

AM 





01/05/2022


Pt seen and examined 


Discussed with RN


Chart reviewed 


Pt resting in bed, requires 15L O2 today 


Continue COVID protocol





01/04/2022


Pt seen and examined 


Discussed with RN


Chart reviewed 


Pt resting in bed, NAD 





01/03/2022


Pt seen and examined 


Discussed with RN


Chart reviewed


Continue COVID protocol with decadron, remdesivir, Vit C and Zinc





increase the lantus and SSI and meal


add percocet and Robitsussin with codeine


he looks worse, weak,  can barely eat,  poor po intake,  on IV fluids, 


blood sugar high,  may transtion to PPN if that improved,





Vitals/I&O


Vitals/I&O:





                                   Vital Signs








  Date Time  Temp Pulse Resp B/P (MAP) Pulse Ox O2 Delivery O2 Flow Rate FiO2


 


1/10/22 11:00 97.8 74 18 140/74 (96) 94 Nasal Cannula  





 97.8       


 


1/10/22 08:00       2.0 














                                    I & O   


 


 1/9/22 1/9/22 1/10/22





 15:00 23:00 07:00


 


Intake Total 300 ml  


 


Output Total   1250 ml


 


Balance 300 ml  -1250 ml











Physical Exam


General:  Alert, Cooperative, moderate distress


Abdomen:  Soft


Extremities:  No clubbing, No edema


Skin:  No rashes, No significant lesion





Labs


Labs:





Laboratory Tests








Test


 1/9/22


13:59 1/9/22


17:33 1/9/22


21:43 1/10/22


07:38


 


Glucose (Fingerstick)


 251 mg/dL


(70-99) 314 mg/dL


(70-99) 288 mg/dL


(70-99) 133 mg/dL


(70-99)











Assessment and Plan


Assessmemt and Plan


Problems


Medical Problems:


(1) COVID-19


Status: Acute  





(2) Pneumonia


Status: Acute  





(3) Respiratory distress


Status: Acute  











Comment


Review of Relevant


I have reviewed the following items zainab (where applicable) has been applied.





Justifications for Admission


Other Justification














MARIPOSA CRAWFORD MD         Thierry 10, 2022 11:54